# Patient Record
Sex: FEMALE | Race: WHITE | NOT HISPANIC OR LATINO | Employment: STUDENT | URBAN - METROPOLITAN AREA
[De-identification: names, ages, dates, MRNs, and addresses within clinical notes are randomized per-mention and may not be internally consistent; named-entity substitution may affect disease eponyms.]

---

## 2022-06-23 ENCOUNTER — TRANSFERRED RECORDS (OUTPATIENT)
Dept: HEALTH INFORMATION MANAGEMENT | Facility: CLINIC | Age: 27
End: 2022-06-23

## 2023-08-03 ENCOUNTER — TRANSFERRED RECORDS (OUTPATIENT)
Dept: HEALTH INFORMATION MANAGEMENT | Facility: CLINIC | Age: 28
End: 2023-08-03

## 2023-10-11 ENCOUNTER — MEDICAL CORRESPONDENCE (OUTPATIENT)
Dept: HEALTH INFORMATION MANAGEMENT | Facility: CLINIC | Age: 28
End: 2023-10-11
Payer: MEDICAID

## 2023-10-16 ENCOUNTER — TELEPHONE (OUTPATIENT)
Dept: ORTHOPEDICS | Facility: CLINIC | Age: 28
End: 2023-10-16
Payer: MEDICAID

## 2023-10-16 NOTE — TELEPHONE ENCOUNTER
Writer received an email referral from Dr. Marcos Burgos for osteoid osteoma of left tibia    Karen Gonzalez LPN

## 2023-10-30 NOTE — TELEPHONE ENCOUNTER
Action November 2, 2023 2:41 PM MT   Action Taken Called patient, no answer, called mom, mom will text the patient and have her call us back. Phone # given.    [facility] , tt: Venecia  Action November 2, 2023 3:20 PM MT   Action Taken Patient called back and states imaging was done at [facility]. Patient gave VBOK for CE update.   Called [facility], rep: Bethany will push imaging STAT!     DIAGNOSIS: Left Tibia Osteoid Osteoma   APPOINTMENT DATE: 11/06/2023   NOTES STATUS DETAILS   OFFICE NOTE from referring provider Media Tab 08/03/2023 - Aubrey Rodríguez DO - [facility]'s    MRI PACS [facility]:  11/22/2017 - Left Tib-Fib   XRAYS (IMAGES & REPORTS) PACS . [facility]:  08/04/2023, 06/23/2022 - LT Tib-Fib

## 2023-10-31 NOTE — PROGRESS NOTES
[facility], Orthopaedic Oncology Surgery Consultation  by Anson Jay M.D.    Tiffanie Hdez MRN# [id number]    YOB: 1995     Requesting physician: No ref. provider found  No Ref-Primary, Physician  Arlin Juárez, CNP [facility]            Assessment and Plan:   Assessment:  Lesion of left tibia diaphyseal anterior cortex of uncertain etiology.  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  Differential diagnosis would include juxtacortical and cortical-based bone forming tumor such as juxtacortical osteosarcoma, osteofibrous dysplasia, adamantinoma, or ossified periosteal chondroma, or nonneoplastic entities such as insufficiency stress type fracture.     Plan:  Obtain prior radiographs from 2017 for comparison purposes  Obtain MRI of unilateral left tibia to evaluate for any medullary involvement  Obtain CT of left tibia to look for nidus formation.  In person follow-up visit thereafter.  Patient resides locally attending medical school at .    MD Trini Johnson Family Professor  Oncology and Adult Reconstructive Surgery  Dept Orthopaedic Surgery, [facility]  [phone] office, [phone] pager  [email or web address]             History of Present Illness:   27 year old female  chief complaint    This patient describes history of the last 6 years of a lesion of the left anterior leg over the midportion that has been enlarging in size slowly since that time.  She is never really had any significant symptoms but just describes occasional discomfort with strenuous use such as running that may occur once every couple of months.  No night pain.  No known history of blunt trauma or injury.    Current symptoms:  Problem: Left tibia osteoid osteoma  Onset and duration: since 2017  Awakens from sleep due  to sx's:  No  Precipitating Injury:  No    Other joints or sites painful:  No  Fever: No  Appetite change or weight loss: No  History of prior or existing cancer: No           Physical Exam:     EXAMINATION pertinent findings:   PSYCH: Pleasant, healthy-appearing, alert, oriented x3, cooperative. Normal mood and affect.  VITAL SIGNS: There were no vitals taken for this visit..  Reviewed nursing intake notes.   There is no height or weight on file to calculate BMI.  RESP: non labored breathing   SKIN: grossly normal over lower extremity, left  LYMPHATIC: grossly normal, no adenopathy, no extremity edema  NEURO: grossly normal , no motor deficits  VASCULAR: satisfactory perfusion of all extremities   MUSCULOSKELETAL:   Gait is normal.  Full range of motion of hip and knee noted.  Left knee specifically 0 to 135 degrees range of motion.  No effusion.  Collateral and cruciate ligaments stable.  No warmth.  Left anterior leg has bony prominence, firm, nontender, no erythema or warmth or fluctuance.  Left ankle normal.           Data:   All laboratory data reviewed  All imaging studies reviewed by me    MRI from 2017 is reviewed compared to current MRI of 2023.  Radiographs from 2022 in 2020 are reviewed as well.  Unicortical lesion along the anterior medial cortex of the tibia with thickening noted.  No clear nidus formation is seen, however multiple signal abnormalities within the cortex are noted and concern over subtle findings on the posterior cortex as well.    DATA for DOCUMENTATION:         Past Medical History:   There is no problem list on file for this patient.    No past medical history on file.    Also see scanned health assessment forms.       Past Surgical History:   No past surgical history on file.         Social History:     Social History     Socioeconomic History    Marital status: Single     Spouse name: Not on file    Number of children: Not on file    Years of education: Not on file    Highest  education level: Not on file   Occupational History    Not on file   Tobacco Use    Smoking status: Not on file    Smokeless tobacco: Not on file   Substance and Sexual Activity    Alcohol use: Not on file    Drug use: Not on file    Sexual activity: Not on file   Other Topics Concern    Not on file   Social History Narrative    Not on file     Social Determinants of Health     Financial Resource Strain: Not on file   Food Insecurity: Not on file   Transportation Needs: Not on file   Physical Activity: Not on file   Stress: Not on file   Social Connections: Not on file   Interpersonal Safety: Not on file   Housing Stability: Not on file            Family History:     No family history on file.         Medications:     No current outpatient medications on file.     No current facility-administered medications for this visit.              Review of Systems:   A comprehensive 10 point review of systems (constitutional, ENT, cardiac, peripheral vascular, lymphatic, respiratory, GI, , Musculoskeletal, skin, Neurological) was performed and found to be negative except as described in this note.     See intake form completed by patient

## 2023-11-02 ENCOUNTER — OFFICE VISIT (OUTPATIENT)
Dept: OBGYN | Facility: CLINIC | Age: 28
End: 2023-11-02
Payer: COMMERCIAL

## 2023-11-02 VITALS
DIASTOLIC BLOOD PRESSURE: 76 MMHG | TEMPERATURE: 99.1 F | WEIGHT: 123 LBS | HEART RATE: 76 BPM | RESPIRATION RATE: 16 BRPM | SYSTOLIC BLOOD PRESSURE: 125 MMHG

## 2023-11-02 DIAGNOSIS — Z30.432 ENCOUNTER FOR IUD REMOVAL: ICD-10-CM

## 2023-11-02 DIAGNOSIS — Z01.419 ENCOUNTER FOR ANNUAL ROUTINE GYNECOLOGICAL EXAMINATION: Primary | ICD-10-CM

## 2023-11-02 PROCEDURE — 58301 REMOVE INTRAUTERINE DEVICE: CPT | Performed by: OBSTETRICS & GYNECOLOGY

## 2023-11-02 PROCEDURE — G0124 SCREEN C/V THIN LAYER BY MD: HCPCS | Performed by: PATHOLOGY

## 2023-11-02 PROCEDURE — 87624 HPV HI-RISK TYP POOLED RSLT: CPT | Performed by: OBSTETRICS & GYNECOLOGY

## 2023-11-02 PROCEDURE — G0145 SCR C/V CYTO,THINLAYER,RESCR: HCPCS | Performed by: OBSTETRICS & GYNECOLOGY

## 2023-11-02 PROCEDURE — 99385 PREV VISIT NEW AGE 18-39: CPT | Mod: 25 | Performed by: OBSTETRICS & GYNECOLOGY

## 2023-11-02 NOTE — PROGRESS NOTES
"Tiffanie is a 27 year old No obstetric history on file. female who presents for annual exam.     Menses are regular q 28-30 days and heavy lasting  4-5  days.  Menses flow: heavy.  Patient's last menstrual period was 10/19/2023 (approximate).. Using IUD for contraception.  She is not currently considering pregnancy.  Besides routine health maintenance,  she would like to have her IUD removed. She had a ParaGard IUD placed about a year ago and had increased bleeding and cramping with periods. Not currently sexually active but would consider a Levonorgestrel IUD in the future if needed.   GYNECOLOGIC HISTORY:  Menarche: 15  Age at first intercourse: 26  Number of lifetime partners: 1  Tiffanie is not sexually active with male partner(s) and is not currently in monogamous relationship.    History sexually transmitted infections:No STD history  STI testing offered?  Accepted  DILMA exposure: Unknown  History of abnormal Pap smear: NO - age 21-29 PAP every 3 years recommended  Family history of breast CA: Yes (Please explain): Maternal & Paternal Grandmother  Family history of uterine/ovarian CA: No    Family history of colon CA: No    HEALTH MAINTENANCE:  Cholesterol: (No results found for: \"CHOL\" History of abnormal lipids: No  Mammo: No . History of abnormal Mammo: Not applicable.  Regular Self Breast Exams: No  Calcium/Vitamin D intake: source:  dietary supplement(s) Adequate? Yes  TSH: (No results found for: \"TSH\" )  Pap; (No results found for: \"PAP\" )    HISTORY:  OB History   No obstetric history on file.     Past Medical History:   Diagnosis Date    Osteoma     Scoliosis      Past Surgical History:   Procedure Laterality Date    NO HISTORY OF SURGERY       No family history on file.  Social History     Socioeconomic History    Marital status: Single     Spouse name: None    Number of children: None    Years of education: None    Highest education level: None   Tobacco Use    Smoking status: Never     Passive exposure: " Never    Smokeless tobacco: Never   Vaping Use    Vaping Use: Never used     No current outpatient medications on file.   No Known Allergies    Past medical, surgical, social and family history were reviewed and updated in EPIC.    ROS:   C:     NEGATIVE for fever, chills, change in weight  I:       NEGATIVE for worrisome rashes, moles or lesions  E:     NEGATIVE for vision changes or irritation  E/M: NEGATIVE for ear, mouth and throat problems  R:     NEGATIVE for significant cough or SOB  CV:   NEGATIVE for chest pain, palpitations or peripheral edema  GI:     NEGATIVE for nausea, abdominal pain, heartburn, or change in bowel habits  :   NEGATIVE for frequency, dysuria, hematuria, vaginal discharge, or irregular bleeding  M:     NEGATIVE for significant arthralgias or myalgia  N:      NEGATIVE for weakness, dizziness or paresthesias  E:      NEGATIVE for temperature intolerance, skin/hair changes  P:      NEGATIVE for changes in mood or affect.    EXAM:  /76 (BP Location: Right arm, Patient Position: Sitting, Cuff Size: Adult Regular)   Pulse 76   Temp 99.1  F (37.3  C)   Resp 16   Wt 55.8 kg (123 lb)   LMP 10/19/2023 (Approximate)    BMI: There is no height or weight on file to calculate BMI.  Constitutional: healthy, alert and no distress  Head: Normocephalic. No masses, lesions, tenderness or abnormalities  Neck: Neck supple. Trachea midline. No adenopathy. Thyroid symmetric, normal size.   Cardiovascular: RRR.   Respiratory: Negative.   Breast: No nodularity, asymmetry or nipple discharge bilaterally.  Gastrointestinal: Abdomen soft, non-tender, non-distended. No masses, organomegaly.  :  Vulva:  No external lesions, normal female hair distribution, no inguinal adenopathy.    Urethra:  Midline, non-tender, well supported, no discharge  Vagina:  Moist, pink, no abnormal discharge, no lesions  Uterus:  Normal size, anteverted , non-tender, freely mobile  Ovaries:  No masses appreciated,  non-tender, mobile  Rectal Exam: deferred  Musculoskeletal: extremities normal  Skin: no suspicious lesions or rashes  Psychiatric: Affect appropriate, cooperative,mentation appears normal.     IUD Removal Procedure Note   After informed consent was obtained and the patient's identify was verified a speculum was placed in the vagina. The IUD threads were visualized and grasped with a ring forceps. The IUD was removed with steady traction on the strings without difficulty. The IUD was inspected and found to be intact. There cervix was hemostatic. The patient tolerated the procedure well. EBL none.     COUNSELING:   Reviewed preventive health counseling, as reflected in patient instructions       Contraception   reports that she has never smoked. She has never been exposed to tobacco smoke. She has never used smokeless tobacco.    There is no height or weight on file to calculate BMI.    FRAX Risk Assessment    ASSESSMENT:  27 year old female with satisfactory annual exam  (Z01.419) Encounter for annual routine gynecological examination  (primary encounter diagnosis)  -Normal clinical  breast and pelvic exam   -Pap obtained   -HPV and flu vaccine UTD, will get COVID in the next few weeks.   -Negative depression screen  -IUD removed, declines need for contraception at this time but options briefly reviewed   Plan: Pap screen reflex to HPV if ASCUS - recommend         age 25 - 29            (Z30.432) Encounter for IUD removal  Comment: IUD removed without difficulty   Plan: REMOVE INTRAUTERINE DEVICE          RTC in one year or sooner MUNIRA Desai MD

## 2023-11-06 ENCOUNTER — OFFICE VISIT (OUTPATIENT)
Dept: ORTHOPEDICS | Facility: CLINIC | Age: 28
End: 2023-11-06
Payer: COMMERCIAL

## 2023-11-06 ENCOUNTER — PRE VISIT (OUTPATIENT)
Dept: ORTHOPEDICS | Facility: CLINIC | Age: 28
End: 2023-11-06

## 2023-11-06 VITALS — BODY MASS INDEX: 19.29 KG/M2 | WEIGHT: 120 LBS | HEIGHT: 66 IN

## 2023-11-06 DIAGNOSIS — M89.9 BONE LESION: Primary | ICD-10-CM

## 2023-11-06 DIAGNOSIS — D16.22 BENIGN NEOPLASM OF LONG BONE OF LEFT LOWER LIMB: ICD-10-CM

## 2023-11-06 PROCEDURE — 99204 OFFICE O/P NEW MOD 45 MIN: CPT | Performed by: ORTHOPAEDIC SURGERY

## 2023-11-06 NOTE — LETTER
11/6/2023         RE: Tiffanie Hdez  [address]  [facility] [address]          [facility], Orthopaedic Oncology Surgery Consultation  by Anson Jay M.D.    Tiffanie Hdez MRN# [id number]    YOB: 1995     Requesting physician: No ref. provider found  No Ref-Primary, Physician  Arlin Juárez, CNP [facility]            Assessment and Plan:   Assessment:  Lesion of left tibia diaphyseal anterior cortex of uncertain etiology.  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  Differential diagnosis would include juxtacortical and cortical-based bone forming tumor such as juxtacortical osteosarcoma, osteofibrous dysplasia, adamantinoma, or ossified periosteal chondroma, or nonneoplastic entities such as insufficiency stress type fracture.     Plan:  Obtain prior radiographs from 2017 for comparison purposes  Obtain MRI of unilateral left tibia to evaluate for any medullary involvement  Obtain CT of left tibia to look for nidus formation.  In person follow-up visit thereafter.  Patient resides locally attending medical school at .    MD Trini Johnson Family Professor  Oncology and Adult Reconstructive Surgery  Dept Orthopaedic Surgery, [facility]  [phone] office, [phone] pager  [email or web address]             History of Present Illness:   27 year old female  chief complaint    This patient describes history of the last 6 years of a lesion of the left anterior leg over the midportion that has been enlarging in size slowly since that time.  She is never really had any significant symptoms but just describes occasional discomfort with strenuous use such as running that may occur once every couple of months.  No night pain.  No known history of blunt trauma or injury.    Current  symptoms:  Problem: Left tibia osteoid osteoma  Onset and duration: since 2017  Awakens from sleep due to sx's:  No  Precipitating Injury:  No    Other joints or sites painful:  No  Fever: No  Appetite change or weight loss: No  History of prior or existing cancer: No           Physical Exam:     EXAMINATION pertinent findings:   PSYCH: Pleasant, healthy-appearing, alert, oriented x3, cooperative. Normal mood and affect.  VITAL SIGNS: There were no vitals taken for this visit..  Reviewed nursing intake notes.   There is no height or weight on file to calculate BMI.  RESP: non labored breathing   SKIN: grossly normal over lower extremity, left  LYMPHATIC: grossly normal, no adenopathy, no extremity edema  NEURO: grossly normal , no motor deficits  VASCULAR: satisfactory perfusion of all extremities   MUSCULOSKELETAL:   Gait is normal.  Full range of motion of hip and knee noted.  Left knee specifically 0 to 135 degrees range of motion.  No effusion.  Collateral and cruciate ligaments stable.  No warmth.  Left anterior leg has bony prominence, firm, nontender, no erythema or warmth or fluctuance.  Left ankle normal.           Data:   All laboratory data reviewed  All imaging studies reviewed by me    MRI from 2017 is reviewed compared to current MRI of 2023.  Radiographs from 2022 in 2020 are reviewed as well.  Unicortical lesion along the anterior medial cortex of the tibia with thickening noted.  No clear nidus formation is seen, however multiple signal abnormalities within the cortex are noted and concern over subtle findings on the posterior cortex as well.    DATA for DOCUMENTATION:         Past Medical History:   There is no problem list on file for this patient.    No past medical history on file.    Also see scanned health assessment forms.       Past Surgical History:   No past surgical history on file.         Social History:     Social History     Socioeconomic History    Marital status: Single     Spouse  name: Not on file    Number of children: Not on file    Years of education: Not on file    Highest education level: Not on file   Occupational History    Not on file   Tobacco Use    Smoking status: Not on file    Smokeless tobacco: Not on file   Substance and Sexual Activity    Alcohol use: Not on file    Drug use: Not on file    Sexual activity: Not on file   Other Topics Concern    Not on file   Social History Narrative    Not on file     Social Determinants of Health     Financial Resource Strain: Not on file   Food Insecurity: Not on file   Transportation Needs: Not on file   Physical Activity: Not on file   Stress: Not on file   Social Connections: Not on file   Interpersonal Safety: Not on file   Housing Stability: Not on file            Family History:     No family history on file.         Medications:     No current outpatient medications on file.     No current facility-administered medications for this visit.              Review of Systems:   A comprehensive 10 point review of systems (constitutional, ENT, cardiac, peripheral vascular, lymphatic, respiratory, GI, , Musculoskeletal, skin, Neurological) was performed and found to be negative except as described in this note.     See intake form completed by patient

## 2023-11-06 NOTE — NURSING NOTE
"Chief Complaint   Patient presents with    Consult     Osteoid osteoma of left tibia       27 year old  1995    Primary MD: No Primary Physician  Ref. MD: Dr. Marcos Burgos    Ht 1.67 m (5' 5.75\")   Wt 54.4 kg (120 lb)   LMP 10/19/2023 (Approximate)   BMI 19.52 kg/m             Pain Assessment  Patient Currently in Pain: Kailyn               [id number] [address] AT [address] [place] [address]        No Known Allergies        No current outpatient medications on file.     No current facility-administered medications for this visit.         Questionnaires:    Promis 10 Assessment        11/5/2023     3:28 PM   PROMIS 10   In general, would you say your health is: Very good   In general, would you say your quality of life is: Very good   In general, how would you rate your physical health? Very good   In general, how would you rate your mental health, including your mood and your ability to think? Very good   In general, how would you rate your satisfaction with your social activities and relationships? Very good   In general, please rate how well you carry out your usual social activities and roles Very good   To what extent are you able to carry out your everyday physical activities such as walking, climbing stairs, carrying groceries, or moving a chair? Completely   In the past 7 days, how often have you been bothered by emotional problems such as feeling anxious, depressed, or irritable? Rarely   In the past 7 days, how would you rate your fatigue on average? Mild   In the past 7 days, how would you rate your pain on average, where 0 means no pain, and 10 means worst imaginable pain? 0   In general, would you say your health is: 4   In general, would you say your quality of life is: 4   In general, how would you rate your physical health? 4   In general, how would you rate your mental health, including your mood and your ability to think? 4   In general, how would " you rate your satisfaction with your social activities and relationships? 4   In general, please rate how well you carry out your usual social activities and roles. (This includes activities at home, at work and in your community, and responsibilities as a parent, child, spouse, employee, friend, etc.) 4   To what extent are you able to carry out your everyday physical activities such as walking, climbing stairs, carrying groceries, or moving a chair? 5   In the past 7 days, how often have you been bothered by emotional problems such as feeling anxious, depressed, or irritable? 2   In the past 7 days, how would you rate your fatigue on average? 2   In the past 7 days, how would you rate your pain on average, where 0 means no pain, and 10 means worst imaginable pain? 0   Global Mental Health Score 16   Global Physical Health Score 18   PROMIS TOTAL - SUBSCORES 34

## 2023-11-06 NOTE — LETTER
11/6/2023         RE: Tiffanie Hdez  [address]  [facility] [address]        Dear Colleague,    Thank you for referring your patient, Tiffanie Hdez, to the [facility] ORTHOP[facility] [place]. Please see a copy of my visit note below.        [facility], Orthopaedic Oncology Surgery Consultation  by Anson Jay M.D.    Tiffanie Hdez MRN# [id number]    YOB: 1995     Requesting physician: No ref. provider found  No Ref-Primary, Physician  Arlin Juárez, CNP [facility]            Assessment and Plan:   Assessment:  Lesion of left tibia diaphyseal anterior cortex of uncertain etiology.  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  Differential diagnosis would include juxtacortical and cortical-based bone forming tumor such as juxtacortical osteosarcoma, osteofibrous dysplasia, adamantinoma, or ossified periosteal chondroma, or nonneoplastic entities such as insufficiency stress type fracture.     Plan:  Obtain prior radiographs from 2017 for comparison purposes  Obtain MRI of unilateral left tibia to evaluate for any medullary involvement  Obtain CT of left tibia to look for nidus formation.  In person follow-up visit thereafter.  Patient resides locally attending medical school at .    MD Trini Johnson Family Professor  Oncology and Adult Reconstructive Surgery  Dept Orthopaedic Surgery, [facility]  [phone] office, [phone] pager  [email or web address]             History of Present Illness:   27 year old female  chief complaint    This patient describes history of the last 6 years of a lesion of the left anterior leg over the midportion that has been enlarging in size slowly since that time.  She is never really had any significant symptoms but just describes  occasional discomfort with strenuous use such as running that may occur once every couple of months.  No night pain.  No known history of blunt trauma or injury.    Current symptoms:  Problem: Left tibia osteoid osteoma  Onset and duration: since 2017  Awakens from sleep due to sx's:  No  Precipitating Injury:  No    Other joints or sites painful:  No  Fever: No  Appetite change or weight loss: No  History of prior or existing cancer: No           Physical Exam:     EXAMINATION pertinent findings:   PSYCH: Pleasant, healthy-appearing, alert, oriented x3, cooperative. Normal mood and affect.  VITAL SIGNS: There were no vitals taken for this visit..  Reviewed nursing intake notes.   There is no height or weight on file to calculate BMI.  RESP: non labored breathing   SKIN: grossly normal over lower extremity, left  LYMPHATIC: grossly normal, no adenopathy, no extremity edema  NEURO: grossly normal , no motor deficits  VASCULAR: satisfactory perfusion of all extremities   MUSCULOSKELETAL:   Gait is normal.  Full range of motion of hip and knee noted.  Left knee specifically 0 to 135 degrees range of motion.  No effusion.  Collateral and cruciate ligaments stable.  No warmth.  Left anterior leg has bony prominence, firm, nontender, no erythema or warmth or fluctuance.  Left ankle normal.           Data:   All laboratory data reviewed  All imaging studies reviewed by me    MRI from 2017 is reviewed compared to current MRI of 2023.  Radiographs from 2022 in 2020 are reviewed as well.  Unicortical lesion along the anterior medial cortex of the tibia with thickening noted.  No clear nidus formation is seen, however multiple signal abnormalities within the cortex are noted and concern over subtle findings on the posterior cortex as well.    DATA for DOCUMENTATION:         Past Medical History:   There is no problem list on file for this patient.    No past medical history on file.    Also see [facility] assessment  forms.       Past Surgical History:   No past surgical history on file.         Social History:     Social History     Socioeconomic History    Marital status: Single     Spouse name: Not on file    Number of children: Not on file    Years of education: Not on file    Highest education level: Not on file   Occupational History    Not on file   Tobacco Use    Smoking status: Not on file    Smokeless tobacco: Not on file   Substance and Sexual Activity    Alcohol use: Not on file    Drug use: Not on file    Sexual activity: Not on file   Other Topics Concern    Not on file   Social History Narrative    Not on file     Social Determinants of Health     Financial Resource Strain: Not on file   Food Insecurity: Not on file   Transportation Needs: Not on file   Physical Activity: Not on file   Stress: Not on file   Social Connections: Not on file   Interpersonal Safety: Not on file   Housing Stability: Not on file            Family History:     No family history on file.         Medications:     No current outpatient medications on file.     No current facility-administered medications for this visit.              Review of Systems:   A comprehensive 10 point review of systems (constitutional, ENT, cardiac, peripheral vascular, lymphatic, respiratory, GI, , Musculoskeletal, skin, Neurological) was performed and found to be negative except as described in this note.     See intake form completed by patient

## 2023-11-06 NOTE — LETTER
11/6/2023       RE: Tiffanie Hdez  [address]  [place] [address]     Dear Colleague,    Thank you for referring your patient, Tiffanie Hdez, to the [facility] ORTHOPEDIC [facility] [place] [facility]. Please see a copy of my visit note below.        [facility], Orthopaedic Oncology Surgery Consultation  by Anson Jay M.D.    Tiffanie Hdez MRN# [id number]    YOB: 1995     Requesting physician: No ref. provider found  No Ref-Primary, Physician  Arlin Juárez, CNP [facility]            Assessment and Plan:   Assessment:  Lesion of left tibia diaphyseal anterior cortex of uncertain etiology.  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  Differential diagnosis would include juxtacortical and cortical-based bone forming tumor such as juxtacortical osteosarcoma, osteofibrous dysplasia, adamantinoma, or ossified periosteal chondroma, or nonneoplastic entities such as insufficiency stress type fracture.     Plan:  Obtain prior radiographs from 2017 for comparison purposes  Obtain MRI of unilateral left tibia to evaluate for any medullary involvement  Obtain CT of left tibia to look for nidus formation.  In person follow-up visit thereafter.  Patient resides locally attending medical school at .    MD Trini Johnson Family Professor  Oncology and Adult Reconstructive Surgery  Dept Orthopaedic Surgery, [facility]  [phone] office, [phone] pager  [email or web address]             History of Present Illness:   27 year old female  chief complaint    This patient describes history of the last 6 years of a lesion of the left anterior leg over the midportion that has been enlarging in size slowly since that time.  She is never really had  any significant symptoms but just describes occasional discomfort with strenuous use such as running that may occur once every couple of months.  No night pain.  No known history of blunt trauma or injury.    Current symptoms:  Problem: Left tibia osteoid osteoma  Onset and duration: since 2017  Awakens from sleep due to sx's:  No  Precipitating Injury:  No    Other joints or sites painful:  No  Fever: No  Appetite change or weight loss: No  History of prior or existing cancer: No           Physical Exam:     EXAMINATION pertinent findings:   PSYCH: Pleasant, healthy-appearing, alert, oriented x3, cooperative. Normal mood and affect.  VITAL SIGNS: There were no vitals taken for this visit..  Reviewed nursing intake notes.   There is no height or weight on file to calculate BMI.  RESP: non labored breathing   SKIN: grossly normal over lower extremity, left  LYMPHATIC: grossly normal, no adenopathy, no extremity edema  NEURO: grossly normal , no motor deficits  VASCULAR: satisfactory perfusion of all extremities   MUSCULOSKELETAL:   Gait is normal.  Full range of motion of hip and knee noted.  Left knee specifically 0 to 135 degrees range of motion.  No effusion.  Collateral and cruciate ligaments stable.  No warmth.  Left anterior leg has bony prominence, firm, nontender, no erythema or warmth or fluctuance.  Left ankle normal.           Data:   All laboratory data reviewed  All imaging studies reviewed by me    MRI from 2017 is reviewed compared to current MRI of 2023.  Radiographs from 2022 in 2020 are reviewed as well.  Unicortical lesion along the anterior medial cortex of the tibia with thickening noted.  No clear nidus formation is seen, however multiple signal abnormalities within the cortex are noted and concern over subtle findings on the posterior cortex as well.    DATA for DOCUMENTATION:         Past Medical History:   There is no problem list on file for this patient.    No past medical history on  file.    Also see scanned health assessment forms.       Past Surgical History:   No past surgical history on file.         Social History:     Social History     Socioeconomic History     Marital status: Single     Spouse name: Not on file     Number of children: Not on file     Years of education: Not on file     Highest education level: Not on file   Occupational History     Not on file   Tobacco Use     Smoking status: Not on file     Smokeless tobacco: Not on file   Substance and Sexual Activity     Alcohol use: Not on file     Drug use: Not on file     Sexual activity: Not on file   Other Topics Concern     Not on file   Social History Narrative     Not on file     Social Determinants of Health     Financial Resource Strain: Not on file   Food Insecurity: Not on file   Transportation Needs: Not on file   Physical Activity: Not on file   Stress: Not on file   Social Connections: Not on file   Interpersonal Safety: Not on file   Housing Stability: Not on file            Family History:     No family history on file.         Medications:     No current outpatient medications on file.     No current facility-administered medications for this visit.              Review of Systems:   A comprehensive 10 point review of systems (constitutional, ENT, cardiac, peripheral vascular, lymphatic, respiratory, GI, , Musculoskeletal, skin, Neurological) was performed and found to be negative except as described in this note.     See intake form completed by patient      Again, thank you for allowing me to participate in the care of your patient.      Sincerely,    Anson Jay MD

## 2023-11-08 LAB
BKR LAB AP GYN ADEQUACY: ABNORMAL
BKR LAB AP GYN INTERPRETATION: ABNORMAL
BKR LAB AP HPV REFLEX: ABNORMAL
BKR LAB AP LMP: ABNORMAL
BKR LAB AP PREVIOUS ABNORMAL: ABNORMAL
PATH REPORT.COMMENTS IMP SPEC: ABNORMAL
PATH REPORT.COMMENTS IMP SPEC: ABNORMAL
PATH REPORT.RELEVANT HX SPEC: ABNORMAL

## 2023-11-10 PROBLEM — R87.610 ASCUS OF CERVIX WITH NEGATIVE HIGH RISK HPV: Status: ACTIVE | Noted: 2023-11-02

## 2023-11-10 LAB
HUMAN PAPILLOMA VIRUS 16 DNA: NEGATIVE
HUMAN PAPILLOMA VIRUS 18 DNA: NEGATIVE
HUMAN PAPILLOMA VIRUS FINAL DIAGNOSIS: NORMAL
HUMAN PAPILLOMA VIRUS OTHER HR: NEGATIVE

## 2023-11-28 NOTE — PROGRESS NOTES
[facility], Orthopaedic Oncology Surgery Consultation  by Anson Jay M.D.    Tiffanie Hdez MRN# [id number]    YOB: 1995     Requesting physician: No ref. provider found  No Ref-Primary, Physician  Arlin Juárez, CNP [facility]     Tiffanie returns for recheck and to review the MRI and imaging findings.  We also obtained her scan from 2017 for comparison purposes.    She remains asymptomatic.  She confirms that she never had any pain involving her leg, even dating back to 2017.    MRI examination and CT reviewed.  I also reviewed with our [facility] radiology team as well.  The lesion is juxtacortical involving the surface of her tibial diaphysis and there is no involvement of the intramedullary canal.    Stability favors a benign diagnosis yet the appearance is not typical for any particular entity but osteoid osteoma is favored.         Assessment and Plan:   Assessment:  Lesion of left tibia diaphyseal anterior cortex of uncertain etiology.  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  Differential diagnosis would include juxtacortical and cortical-based bone forming tumor such as juxtacortical osteosarcoma, osteofibrous dysplasia, adamantinoma, or ossified periosteal chondroma, or nonneoplastic entities such as insufficiency stress type fracture.     Plan:  I had a detailed conversation with Tiffanie about the pros and cons of either serial observation as opposed to performing a biopsy.  As the appearance is not typical, and her history does not fit with a suspected imaging diagnoses, I recommended proceeding with a biopsy procedure and the patient has indicated that she prefers this as well.  Her main concern is ruling out any concerning diagnosis as opposed to the cosmetic appearanc of either the mass or a surgical  incision.    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    Anson Jay MD  [facility] Family Professor  Oncology and Adult Reconstructive Surgery  Dept Orthopaedic Surgery, [facility]  [phone] office, [phone] pager  [email or web address]    Total combined visit time and work time before and after clinic visit on encounter date = 20 min

## 2023-11-29 ENCOUNTER — ANCILLARY PROCEDURE (OUTPATIENT)
Dept: CT IMAGING | Facility: CLINIC | Age: 28
End: 2023-11-29
Attending: ORTHOPAEDIC SURGERY
Payer: COMMERCIAL

## 2023-11-29 DIAGNOSIS — M89.9 BONE LESION: ICD-10-CM

## 2023-11-29 PROCEDURE — 73700 CT LOWER EXTREMITY W/O DYE: CPT | Mod: LT | Performed by: RADIOLOGY

## 2023-12-03 ENCOUNTER — ANCILLARY PROCEDURE (OUTPATIENT)
Dept: MRI IMAGING | Facility: CLINIC | Age: 28
End: 2023-12-03
Attending: ORTHOPAEDIC SURGERY
Payer: COMMERCIAL

## 2023-12-03 DIAGNOSIS — M89.9 BONE LESION: ICD-10-CM

## 2023-12-03 PROCEDURE — A9585 GADOBUTROL INJECTION: HCPCS | Performed by: RADIOLOGY

## 2023-12-03 PROCEDURE — 73720 MRI LWR EXTREMITY W/O&W/DYE: CPT | Mod: LT | Performed by: RADIOLOGY

## 2023-12-03 RX ORDER — GADOBUTROL 604.72 MG/ML
7.5 INJECTION INTRAVENOUS ONCE
Status: COMPLETED | OUTPATIENT
Start: 2023-12-03 | End: 2023-12-03

## 2023-12-03 RX ADMIN — GADOBUTROL 5 ML: 604.72 INJECTION INTRAVENOUS at 08:45

## 2023-12-04 ENCOUNTER — OFFICE VISIT (OUTPATIENT)
Dept: ORTHOPEDICS | Facility: CLINIC | Age: 28
End: 2023-12-04
Payer: COMMERCIAL

## 2023-12-04 DIAGNOSIS — M89.9 BONE LESION: Primary | ICD-10-CM

## 2023-12-04 PROCEDURE — 99214 OFFICE O/P EST MOD 30 MIN: CPT | Performed by: ORTHOPAEDIC SURGERY

## 2023-12-04 NOTE — LETTER
12/4/2023         RE: Tiffanie Hdez  [address]  [place] [facility] [address]        Dear Colleague,    Thank you for referring your patient, Tiffanie Hdez, to the [facility]THOPED[facility] [place]. Please see a copy of my visit note below.        [facility], Orthopaedic Oncology Surgery Consultation  by Anson Jay M.D.    Tiffanie Hdez MRN# [id number]    YOB: 1995     Requesting physician: No ref. provider found  No Ref-Primary, Physician  Arlin Juárez, CNP [facility]     Tiffanie returns for recheck and to review the MRI and imaging findings.  We also obtained her scan from 2017 for comparison purposes.    She remains asymptomatic.  She confirms that she never had any pain involving her leg, even dating back to 2017.    MRI examination and CT reviewed.  I also reviewed with our [facility] radiology team as well.  The lesion is juxtacortical involving the surface of her tibial diaphysis and there is no involvement of the intramedullary canal.    Stability favors a benign diagnosis yet the appearance is not typical for any particular entity but osteoid osteoma is favored.         Assessment and Plan:   Assessment:  Lesion of left tibia diaphyseal anterior cortex of uncertain etiology.  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  Differential diagnosis would include juxtacortical and cortical-based bone forming tumor such as juxtacortical osteosarcoma, osteofibrous dysplasia, adamantinoma, or ossified periosteal chondroma, or nonneoplastic entities such as insufficiency stress type fracture.     Plan:  I had a detailed conversation with Tiffanie about the pros and cons of either serial observation as opposed to performing a biopsy.  As the appearance is not typical, and her history does not fit with a  suspected imaging diagnoses, I recommended proceeding with a biopsy procedure and the patient has indicated that she prefers this as well.  Her main concern is ruling out any concerning diagnosis as opposed to the cosmetic appearanc of either the mass or a surgical incision.    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    MD Trini Johnson Family Professor  Oncology and Adult Reconstructive Surgery  Dept Orthopaedic Surgery, [facility]  [phone] office, [phone] pager  [email or web address]    Total combined visit time and work time before and after clinic visit on encounter date = 20 min

## 2023-12-04 NOTE — NURSING NOTE
- Surgery packet sent in mail. Attempted to call patient today 12/4 at 2:11 and voicemail left to call back.     - Attempted to call patient today 12/6 at 11:56am and voicemail left to call back.

## 2023-12-07 ENCOUNTER — TELEPHONE (OUTPATIENT)
Dept: ORTHOPEDICS | Facility: CLINIC | Age: 28
End: 2023-12-07
Payer: COMMERCIAL

## 2023-12-08 ENCOUNTER — TELEPHONE (OUTPATIENT)
Dept: ORTHOPEDICS | Facility: CLINIC | Age: 28
End: 2023-12-08
Payer: COMMERCIAL

## 2023-12-08 NOTE — TELEPHONE ENCOUNTER
Phoned patient to get her schedule for surgery with Dr. Jay.     Call went directly to voicemail .Provided reason of call and call back number of [phone].    Will try again.

## 2023-12-08 NOTE — NURSING NOTE
A call was placed to the patient and pre-op teaching was performed over the phone.    Teaching Flowsheet   Relevant Diagnosis: L lower extremity biopsy  Teaching Topic: Pre-Operative Teaching     Person(s) involved in teaching:   Patient     Motivation Level:  Asks Questions: Yes  Eager to Learn: Yes  Cooperative: Yes  Receptive (willing/able to accept information): Yes  Any cultural factors/Synagogue beliefs that may influence understanding or compliance? No     Patient demonstrates understanding of the following:  Reason for the appointment, diagnosis and treatment plan: Yes  Knowledge of proper use of medications and conditions for which they are ordered (with special attention to potential side effects or drug interactions): Yes  Which situations necessitate calling provider and whom to contact: Yes- discussed the stoplight tool to help assist with this.      Teaching Concerns Addressed:      Proper use of surgical scrub explain and provided to patient.    Nutritional needs and diet plan: Yes  Pain management techniques: Yes  Wound Care: Yes  How and/when to access community resources: Yes     Instructional Materials Used/Given: surgical packet mailed. Instructed to purchase surgical soap at local pharmacy      - Important contact info/ phone numbers  - Map/ location of surgery  - Showering instructions  - Stop light tool    Additionally the following was discussed with patient:  - Patient informed responsible adult is required to drive her home and stay with her for 24 hours after surgery.        -Next step: Perioperative  to contact patient and schedule surgery/post ops. Patient does not have a PCP and is interested in seeing PAC for pre op H&P.    Time spent with patient: 15 minutes.     Tara Holter, RNCC

## 2023-12-08 NOTE — CONFIDENTIAL NOTE
Call placed to patient. Pre-op teaching preformed. See office visit 12/4 for documentation.    Tara Holter, RNCC

## 2023-12-12 ENCOUNTER — HOSPITAL ENCOUNTER (OUTPATIENT)
Facility: CLINIC | Age: 28
End: 2023-12-12
Attending: ORTHOPAEDIC SURGERY
Payer: COMMERCIAL

## 2023-12-12 PROBLEM — M89.9 BONE LESION: Status: ACTIVE | Noted: 2023-12-04

## 2023-12-12 NOTE — TELEPHONE ENCOUNTER
Patient is scheduled for surgery with Dr. Jay    Spoke with: Patient    Date of Surgery: 1/2/24    Location: [place]    Post op: 2 weeks    Pre op with Provider: Complete    H&P: Scheduled with PAC    Additional imaging/appointments: N/A    Surgery packet: Mailed to patient by RNCC    Additional comments: N/A        Cristiana Coburn MA on 12/12/2023 at 2:53 PM

## 2023-12-13 NOTE — TELEPHONE ENCOUNTER
FUTURE VISIT INFORMATION      SURGERY INFORMATION:  Date: 1/2/24  Location: [facility]  Surgeon:  Anson Jay MD   Anesthesia Type:  choice  Procedure: BIOPSY, BONE, LOWER EXTREMITY, Tibia diaphysis, Left.  No pre-op antibiotics   Consult: ov 12/4    RECORDS REQUESTED FROM:       Pertinent Medical History: None

## 2023-12-14 ENCOUNTER — VIRTUAL VISIT (OUTPATIENT)
Dept: SURGERY | Facility: CLINIC | Age: 28
End: 2023-12-14
Payer: COMMERCIAL

## 2023-12-14 ENCOUNTER — PRE VISIT (OUTPATIENT)
Dept: SURGERY | Facility: CLINIC | Age: 28
End: 2023-12-14

## 2023-12-14 ENCOUNTER — ANESTHESIA EVENT (OUTPATIENT)
Dept: SURGERY | Facility: CLINIC | Age: 28
End: 2023-12-14
Payer: COMMERCIAL

## 2023-12-14 DIAGNOSIS — M89.9 BONE LESION: ICD-10-CM

## 2023-12-14 DIAGNOSIS — Z01.818 PREOP EXAMINATION: Primary | ICD-10-CM

## 2023-12-14 PROCEDURE — 99203 OFFICE O/P NEW LOW 30 MIN: CPT | Mod: VID | Performed by: CLINICAL NURSE SPECIALIST

## 2023-12-14 ASSESSMENT — ENCOUNTER SYMPTOMS
DYSRHYTHMIAS: 0
SEIZURES: 0

## 2023-12-14 ASSESSMENT — LIFESTYLE VARIABLES: TOBACCO_USE: 0

## 2023-12-14 NOTE — H&P
Pre-Operative H & P     CC:  Preoperative exam to assess for increased cardiopulmonary risk while undergoing surgery and anesthesia.    Date of Encounter: 12/14/2023  Primary Care Physician:  No Ref-Primary, Physician     Reason for visit:   Encounter Diagnoses   Name Primary?    Preop examination Yes    Bone lesion        HPI  Tiffanie Hdez is a 28 year old female who presents for pre-operative H & P in preparation for  Procedure Information       Case: [id number] Date/Time: 01/02/24 1100    Procedure: BIOPSY, BONE, LOWER EXTREMITY, Tibia diaphysis, Left.  No pre-op antibiotics (Left: Leg)    Anesthesia type: Choice    Diagnosis: Bone lesion [M89.9]    Pre-op diagnosis: Bone lesion [M89.9]    Location: [facility]    Providers: Anson Jay MD            History is obtained from the patient and chart review    Patient with history of a lesion of the left tibia diaphyseal anterior cortex of uncertain etiology. Per notes the initial working diagnosis in 2017 was osteoid osteoma, however, imaging features are somewhat atypical as well as clinical features of being asymptomatic and continued growth in size over the last 6 years. She has been recently evaluated by Dr. Jay and counseled for above procedure for further evaluation.    She is otherwise healthy    Hx of abnormal bleeding or anti-platelet use: Denies    Menstrual history: Patient's last menstrual period was 10/19/2023 (approximate).     Past Medical History  Past Medical History:   Diagnosis Date    Bone lesion     Osteoma     Scoliosis        Past Surgical History  Past Surgical History:   Procedure Laterality Date    NO HISTORY OF SURGERY      [place] teeth extraction   03/2023       Prior to Admission Medications  No current outpatient medications on file.       Allergies  No Known Allergies    Social History  Social History     Socioeconomic History    Marital status: Single     Spouse name: Not on file    Number of children: Not on file    Years  of education: Not on file    Highest education level: Not on file   Occupational History    Not on file   Tobacco Use    Smoking status: Never     Passive exposure: Never    Smokeless tobacco: Never   Vaping Use    Vaping Use: Never used   Substance and Sexual Activity    Alcohol use: Not on file    Drug use: Not on file    Sexual activity: Not on file   Other Topics Concern    Not on file   Social History Narrative    Not on file     Social Determinants of Health     Financial Resource Strain: Not on file   Food Insecurity: Not on file   Transportation Needs: Not on file   Physical Activity: Not on file   Stress: Not on file   Social Connections: Not on file   Interpersonal Safety: Not on file   Housing Stability: Not on file       Family History  Family History   Problem Relation Age of Onset    Alzheimer Disease Maternal Grandmother     Breast Cancer Maternal Grandfather     Alzheimer Disease Paternal Grandfather     Anesthesia Reaction No family hx of     Clotting Disorder No family hx of        Review of Systems  The complete review of systems is negative other than noted in the HPI or here.   Anesthesia Evaluation   Pt has had prior anesthetic. Type: MAC.    No history of anesthetic complications       ROS/MED HX  ENT/Pulmonary:    (-) tobacco use and recent URI   Neurologic:    (-) no seizures   Cardiovascular:     (+)  - -   -  - -                                 No previous cardiac testing  (-) taking anticoagulants/antiplatelets, BORJA and arrhythmias   METS/Exercise Tolerance: >4 METS    Hematologic:    (-) history of blood clots and history of blood transfusion   Musculoskeletal:  - neg musculoskeletal ROS     GI/Hepatic:    (-) GERD   Renal/Genitourinary:  - neg Renal ROS     Endo:  - neg endo ROS     Psychiatric/Substance Use:    (-) psychiatric history   Infectious Disease:  - neg infectious disease ROS     Malignancy:  - neg malignancy ROS     Other:  - neg other ROS          Virtual visit -  No vitals  were obtained    Physical Exam  Constitutional: Awake, alert, no apparent distress, and appears stated age.  HENT: Normocephalic  Respiratory: non labored breathing; no cough   Neurologic: Oriented to name, place and time.   Neuropsychiatric: Calm, cooperative. Normal affect.      Prior Labs/Diagnostic Studies   All labs and imaging personally reviewed   Not indicated    EKG: Not indicated    12/3/23 MR tib/fib  Between the external markers, persistent focal marked  cortical thickening of the left mid tibial shaft with associated  intra-cortical and intramedullary signal alteration, and mild  periostitis. No soft tissue mass or aggressive imaging features.  Though imaging finding remains nonspecific, primary imaging  differential remains to include acute on chronic stress response  (grade Canelo). Other entity such as osteoid osteoma cannot be entirely  excluded.    CT tib/fib 11/29/23                                                       Impression:  1. Non-aggressive appearing marked focal cortical thickening with  intra-cortical lucencies in the anteromedial tibia. Finding has been  present dating back 11/2017. Imaging differential diagnosis include  entity such osteoid osteoma, sequale of chronic stress  reaction/fractures.    a. No soft tissue component.      The patient's records and results personally reviewed by this provider.     Outside records reviewed from: Care Everywhere      Assessment    Tiffanie Hdez is a 28 year old female seen as a PAC referral for risk assessment and optimization for anesthesia.    Plan/Recommendations  Pt will be optimized for the proposed procedure.  See below for details on the assessment, risk, and preoperative recommendations    NEUROLOGY  - No history of TIA, CVA or seizure    -Post Op delirium risk factors:  No risk identified    ENT  - No current airway concerns.  Will need to be reassessed day of surgery.  Mallampati: Unable to assess  TM: Unable to  "assess    CARDIAC  Denies cardiac history, symptoms or meds. Good exercise tolerance.   - METS (Metabolic Equivalents)>4    RCRI: 0.4% risk of serious cardiac events      PULMONARY  Denies asthma, cough or shortness of breath  Low risk for JENNIFER  - Tobacco History    History   Smoking Status    Never   Smokeless Tobacco    Never       GI: Denies GERD  PONV Medium Risk  Total Score: 2           1 AN PONV: Pt is Female    1 AN PONV: Patient is not a current smoker        ENDOCRINE    - BMI: Estimated body mass index is 19.52 kg/m  as calculated from the following:    Height as of 11/6/23: 1.67 m (5' 5.75\").    Weight as of 11/6/23: 54.4 kg (120 lb).  Healthy Weight (BMI 18.5-24.9)  - No history of Diabetes Mellitus    HEME  VTE Low Risk 0.26%            Total Score: 0      Denies personal or family history of blood clots  Denies history of blood transfusion     MSK: Bone lesion enlarging over time    Different anesthesia methods/types have been discussed with the patient, but they are aware that the final plan will be decided by the assigned anesthesia provider on the date of service.      The patient is optimized for their procedure. AVS with information on surgery time/arrival time, meds and NPO status given by nursing staff. No further diagnostic testing indicated.    Please refer to the physical examination documented by the anesthesiologist in the anesthesia record on the day of surgery.    Video-Visit Details    Type of service:  Video Visit    Provider received verbal consent for a Video Visit from the patient? Yes   Video Start Time: 4:58pm  Video End Time: 5:05pm    Originating Location (pt. Location): Home    Distant Location (provider location):  Off-site  Mode of Communication:  Video Conference via "[employer]  On the day of service:     Prep time: 11 minutes  Visit time: 7 minutes  Documentation time: 13 minutes  ------------------------------------------  Total time: 31 minutes      JH Monroy " CNS  Preoperative Assessment Center  [facility]  Clinic and Surgery Center  Phone: [phone]  Fax: [phone]

## 2023-12-14 NOTE — PATIENT INSTRUCTIONS
Preparing for Your Surgery      Name:  Tiffanie Hdez   MRN:  [id number]   :  1995   Today's Date:  2023       Arriving for surgery:  Surgery date:  2024  Arrival time:  8:30 am    Please come to:     Please come to:      [facility] [address]  [address] [place]  [address]  The Green Ramp for patients and visitors is located beneath the [facility]. The parking facility entrance is at the intersection of [address] [place] [address] and [address]. Patients and visitors who self-park will receive the reduced hospital parking rate (no ticket validation needed).  [employer] parking, located at the [facility] main entrance on [address] [place] [address], is available Monday - Friday from 7 am to 3:30 pm.  Discounted parking pass options can be purchased from  attendants during business hours.  -Check in at the security desk in the [facility] ([facility]) Lobby. They will direct you to the correct elevators.  -Proceed to the 3rd floor, check in at the Adult Surgery Waiting Lounge. [phone]  If you are in need of directions, a wheelchair or escort please stop at the Information Desk in the lobby.  Inform the information person that you are here for surgery; a wheelchair and escort to [address] will be provided.   An escort to the Adult Surgery Waiting Lounge will be provided.    What can I eat or drink?  -  You may eat and drink normally up to 8 hours prior to arrival time. (Until Midnight)  -  You may have clear liquids until 2 hours prior to arrival time. (Until 6:30 am)    Examples of clear liquids:  Water  Clear broth  Juices (apple, white grape, white cranberry  and cider) without pulp  Noncarbonated, powder based beverages  (lemonade and Bhargav-Aid)  Sodas (Sprite, 7-Up, ginger ale and seltzer)  Coffee or tea (without milk or cream)  Gatorade    -  No Alcohol or  cannabis products for at least 24 hours before surgery.     Which medicines can I take?    Hold Aspirin for 7 days before surgery.   Hold Multivitamins for 7 days before surgery.  Hold Supplements for 7 days before surgery.  Hold Ibuprofen (Advil, Motrin) for 3 day(s) before surgery--unless otherwise directed by surgeon.  Hold Naproxen (Aleve) for 4 days before surgery.      -  PLEASE TAKE these medications the day of surgery:  NONE      How do I prepare myself?  - Please take 2 showers (one the night prior to surgery and one the morning of surgery) using Scrubcare or Hibiclens soap.    Use this soap only from the neck to your toes.     Leave the soap on your skin for one minute--then rinse thoroughly.      You may use your own shampoo and conditioner. No other hair products.   - Please remove all jewelry and body piercings.  - No lotions, deodorants or fragrance.  - No makeup or fingernail polish.   - Bring your ID and insurance card.    -If you use a CPAP machine, please bring the CPAP machine, tubing, and mask to hospital.    -If you have a Deep Brain Stimulator, Spinal Cord Stimulator, or any Neuro Stimulator device---you must bring the remote control to the hospital.      ALL PATIENTS GOING HOME THE SAME DAY OF SURGERY ARE REQUIRED TO HAVE A RESPONSIBLE ADULT TO DRIVE AND BE IN ATTENDANCE WITH THEM FOR 24 HOURS FOLLOWING SURGERY.    Covid testing policy as of 12/06/2022  Your surgeon will notify and schedule you for a COVID test if one is needed before surgery--please direct any questions or COVID symptoms to your surgeon      Questions or Concerns:    - For any questions regarding the day of surgery or your hospital stay, please contact the Pre Admission Nursing Office at [phone].       - If you have health changes between today and your surgery, please call your surgeon.       - For questions after surgery, please call your surgeons office.           Current Visitor Guidelines    You may have 2 visitors  in the pre op area.    Visiting hours: 8 a.m. to 8:30 p.m.    You may have four visitors during your inpatient hospital stay.    Patients confirmed or suspected to have symptoms of COVID 19 or flu:     No visitors allowed for adult patients.   Children (under age 18) can have 1 named visitor.     People who are sick or showing symptoms of COVID 19 or flu:    Are not allowed to visit patients--we can only make exceptions in special situations.       Please follow these guidelines for your visit:          Please maintain social distance          Masking is optional--however at times you may be asked to wear a mask for the safety of yourself and others     Clean your hands with alcohol hand . Do this when you arrive at and leave the building and patient room,    And again after you touch your mask or anything in the room.     Go directly to and from the room you are visiting.     Stay in the patient s room during your visit. Limit going to other places in the hospital as much as possible     Leave bags and jackets at home or in the car.     For everyone s health, please don t come and go during your visit. That includes for smoking   during your visit.

## 2023-12-14 NOTE — PROGRESS NOTES
Tiffanie is a 28 year old who is being evaluated via a billable video visit.      How would you like to obtain your AVS? MyChart    HPI           Review of Systems       Physical Exam

## 2023-12-27 ENCOUNTER — PREP FOR PROCEDURE (OUTPATIENT)
Dept: OTHER | Facility: CLINIC | Age: 28
End: 2023-12-27
Payer: COMMERCIAL

## 2023-12-27 NOTE — PROGRESS NOTES
SURGERY PLAN (PRE-OP PLAN)     Patient Position (indicated by x):  X  Supine with torso rolled up on a bump   x  Regular OR table     Kuhn catheter   X  Blue U drape   Blue and White stockinet   Extremity drape   Coban             General Equipment Requests (indicated by x):    X  C-Arm with C-Armor drape     C-Arm (video capable, [employer] 9900 model)     O-Arm with Stealth imaging     Fracture Table     Torey XR Table     SurgiGraphic 6000 (diving board) fluoro table   X  Osteotomes (1/4 inch osteotome)     Pierre Biopsy trephine set w/ K-wire & pituitary rongeurs   X  Small pituitary rongeur   X  Pierre's angled curettes, narrow shaft     Bone graft, kapner gouges     Midas Sterling Medtronic chary, electric motor     Phenol 5%     [place] BMAC stem cell     Vancomycin 1 gram powder     Zometa 4 mg vials     Depo Medrol steroid     Blunt Pelvic Retractor (.55, Blunt Hohmann with  slight bend)     (1) Portable hand held radiation detector machine for sentinel node biopsy and (2) Lymphazurin     [place] Osteotomes     Specimens and cultures (indicated by x):      Tissue cultures, aerobic and anaerobic without gram stain    X  Frozen section    X  pathology specimens - fresh    X  pathology specimens - formalin          Plan  Biopsy of bone lesion of left tibia    Lane Lawrence MD  Adult Joint Reconstruction Fellow  Dept Orthopaedic Surgery, [facility]

## 2024-01-02 ENCOUNTER — HOSPITAL ENCOUNTER (OUTPATIENT)
Facility: CLINIC | Age: 29
Discharge: HOME | End: 2024-01-02
Attending: ORTHOPAEDIC SURGERY | Admitting: ORTHOPAEDIC SURGERY
Payer: COMMERCIAL

## 2024-01-02 ENCOUNTER — APPOINTMENT (OUTPATIENT)
Dept: GENERAL RADIOLOGY | Facility: CLINIC | Age: 29
End: 2024-01-02
Attending: ORTHOPAEDIC SURGERY
Payer: COMMERCIAL

## 2024-01-02 ENCOUNTER — ANESTHESIA (OUTPATIENT)
Dept: SURGERY | Facility: CLINIC | Age: 29
End: 2024-01-02
Payer: COMMERCIAL

## 2024-01-02 VITALS
SYSTOLIC BLOOD PRESSURE: 111 MMHG | RESPIRATION RATE: 16 BRPM | HEART RATE: 44 BPM | BODY MASS INDEX: 19.59 KG/M2 | HEIGHT: 66 IN | DIASTOLIC BLOOD PRESSURE: 72 MMHG | TEMPERATURE: 97.7 F | OXYGEN SATURATION: 100 % | WEIGHT: 121.91 LBS

## 2024-01-02 DIAGNOSIS — M89.9 BONE LESION: Primary | ICD-10-CM

## 2024-01-02 PROCEDURE — 88311 DECALCIFY TISSUE: CPT | Mod: 26 | Performed by: PATHOLOGY

## 2024-01-02 PROCEDURE — 360N000083 HC SURGERY LEVEL 3 W/ FLUORO, PER MIN: Performed by: ORTHOPAEDIC SURGERY

## 2024-01-02 PROCEDURE — 710N000012 HC RECOVERY PHASE 2, PER MINUTE: Performed by: ORTHOPAEDIC SURGERY

## 2024-01-02 PROCEDURE — 999N000180 XR SURGERY CARM FLUORO LESS THAN 5 MIN: Mod: TC

## 2024-01-02 PROCEDURE — 250N000011 HC RX IP 250 OP 636: Performed by: NURSE ANESTHETIST, CERTIFIED REGISTERED

## 2024-01-02 PROCEDURE — 999N000141 HC STATISTIC PRE-PROCEDURE NURSING ASSESSMENT: Performed by: ORTHOPAEDIC SURGERY

## 2024-01-02 PROCEDURE — 87070 CULTURE OTHR SPECIMN AEROBIC: CPT | Performed by: ORTHOPAEDIC SURGERY

## 2024-01-02 PROCEDURE — 250N000009 HC RX 250: Performed by: ORTHOPAEDIC SURGERY

## 2024-01-02 PROCEDURE — 370N000017 HC ANESTHESIA TECHNICAL FEE, PER MIN: Performed by: ORTHOPAEDIC SURGERY

## 2024-01-02 PROCEDURE — 710N000010 HC RECOVERY PHASE 1, LEVEL 2, PER MIN: Performed by: ORTHOPAEDIC SURGERY

## 2024-01-02 PROCEDURE — 88307 TISSUE EXAM BY PATHOLOGIST: CPT | Mod: 26 | Performed by: PATHOLOGY

## 2024-01-02 PROCEDURE — 87102 FUNGUS ISOLATION CULTURE: CPT | Performed by: ORTHOPAEDIC SURGERY

## 2024-01-02 PROCEDURE — 87075 CULTR BACTERIA EXCEPT BLOOD: CPT | Performed by: ORTHOPAEDIC SURGERY

## 2024-01-02 PROCEDURE — 250N000009 HC RX 250: Performed by: NURSE ANESTHETIST, CERTIFIED REGISTERED

## 2024-01-02 PROCEDURE — 88311 DECALCIFY TISSUE: CPT | Mod: TC | Performed by: ORTHOPAEDIC SURGERY

## 2024-01-02 PROCEDURE — 250N000013 HC RX MED GY IP 250 OP 250 PS 637

## 2024-01-02 PROCEDURE — 258N000003 HC RX IP 258 OP 636: Performed by: NURSE ANESTHETIST, CERTIFIED REGISTERED

## 2024-01-02 PROCEDURE — 272N000001 HC OR GENERAL SUPPLY STERILE: Performed by: ORTHOPAEDIC SURGERY

## 2024-01-02 RX ORDER — OXYCODONE HYDROCHLORIDE 5 MG/1
10 TABLET ORAL
Status: DISCONTINUED | OUTPATIENT
Start: 2024-01-02 | End: 2024-01-02 | Stop reason: HOSPADM

## 2024-01-02 RX ORDER — SODIUM CHLORIDE, SODIUM LACTATE, POTASSIUM CHLORIDE, CALCIUM CHLORIDE 600; 310; 30; 20 MG/100ML; MG/100ML; MG/100ML; MG/100ML
INJECTION, SOLUTION INTRAVENOUS CONTINUOUS
Status: DISCONTINUED | OUTPATIENT
Start: 2024-01-02 | End: 2024-01-02 | Stop reason: HOSPADM

## 2024-01-02 RX ORDER — LABETALOL HYDROCHLORIDE 5 MG/ML
10 INJECTION, SOLUTION INTRAVENOUS
Status: DISCONTINUED | OUTPATIENT
Start: 2024-01-02 | End: 2024-01-02 | Stop reason: HOSPADM

## 2024-01-02 RX ORDER — OXYCODONE HYDROCHLORIDE 5 MG/1
5-10 TABLET ORAL EVERY 4 HOURS PRN
Qty: 10 TABLET | Refills: 0 | Status: SHIPPED | OUTPATIENT
Start: 2024-01-02

## 2024-01-02 RX ORDER — ONDANSETRON 2 MG/ML
4 INJECTION INTRAMUSCULAR; INTRAVENOUS EVERY 30 MIN PRN
Status: DISCONTINUED | OUTPATIENT
Start: 2024-01-02 | End: 2024-01-02 | Stop reason: HOSPADM

## 2024-01-02 RX ORDER — ACETAMINOPHEN 325 MG/1
650 TABLET ORAL
Status: DISCONTINUED | OUTPATIENT
Start: 2024-01-02 | End: 2024-01-02 | Stop reason: HOSPADM

## 2024-01-02 RX ORDER — LIDOCAINE 40 MG/G
CREAM TOPICAL
Status: DISCONTINUED | OUTPATIENT
Start: 2024-01-02 | End: 2024-01-02 | Stop reason: HOSPADM

## 2024-01-02 RX ORDER — OXYCODONE HYDROCHLORIDE 5 MG/1
5 TABLET ORAL
Status: DISCONTINUED | OUTPATIENT
Start: 2024-01-02 | End: 2024-01-02 | Stop reason: HOSPADM

## 2024-01-02 RX ORDER — ONDANSETRON 2 MG/ML
INJECTION INTRAMUSCULAR; INTRAVENOUS PRN
Status: DISCONTINUED | OUTPATIENT
Start: 2024-01-02 | End: 2024-01-02

## 2024-01-02 RX ORDER — BUPIVACAINE HYDROCHLORIDE AND EPINEPHRINE 2.5; 5 MG/ML; UG/ML
INJECTION, SOLUTION INFILTRATION; PERINEURAL PRN
Status: DISCONTINUED | OUTPATIENT
Start: 2024-01-02 | End: 2024-01-02 | Stop reason: HOSPADM

## 2024-01-02 RX ORDER — FENTANYL CITRATE 50 UG/ML
25 INJECTION, SOLUTION INTRAMUSCULAR; INTRAVENOUS EVERY 5 MIN PRN
Status: DISCONTINUED | OUTPATIENT
Start: 2024-01-02 | End: 2024-01-02 | Stop reason: HOSPADM

## 2024-01-02 RX ORDER — HYDRALAZINE HYDROCHLORIDE 20 MG/ML
2.5-5 INJECTION INTRAMUSCULAR; INTRAVENOUS EVERY 10 MIN PRN
Status: DISCONTINUED | OUTPATIENT
Start: 2024-01-02 | End: 2024-01-02 | Stop reason: HOSPADM

## 2024-01-02 RX ORDER — HYDROMORPHONE HYDROCHLORIDE 1 MG/ML
0.4 INJECTION, SOLUTION INTRAMUSCULAR; INTRAVENOUS; SUBCUTANEOUS EVERY 5 MIN PRN
Status: DISCONTINUED | OUTPATIENT
Start: 2024-01-02 | End: 2024-01-02 | Stop reason: HOSPADM

## 2024-01-02 RX ORDER — ACETAMINOPHEN 500 MG
1000 TABLET ORAL ONCE
Status: COMPLETED | OUTPATIENT
Start: 2024-01-02 | End: 2024-01-02

## 2024-01-02 RX ORDER — AMOXICILLIN 250 MG
1-2 CAPSULE ORAL 2 TIMES DAILY
Qty: 30 TABLET | Refills: 0 | Status: SHIPPED | OUTPATIENT
Start: 2024-01-02

## 2024-01-02 RX ORDER — FENTANYL CITRATE 50 UG/ML
50 INJECTION, SOLUTION INTRAMUSCULAR; INTRAVENOUS EVERY 5 MIN PRN
Status: DISCONTINUED | OUTPATIENT
Start: 2024-01-02 | End: 2024-01-02 | Stop reason: HOSPADM

## 2024-01-02 RX ORDER — HYDROMORPHONE HYDROCHLORIDE 1 MG/ML
0.2 INJECTION, SOLUTION INTRAMUSCULAR; INTRAVENOUS; SUBCUTANEOUS EVERY 5 MIN PRN
Status: DISCONTINUED | OUTPATIENT
Start: 2024-01-02 | End: 2024-01-02 | Stop reason: HOSPADM

## 2024-01-02 RX ORDER — ACETAMINOPHEN 500 MG
1000 TABLET ORAL ONCE
Status: DISCONTINUED | OUTPATIENT
Start: 2024-01-02 | End: 2024-01-02 | Stop reason: HOSPADM

## 2024-01-02 RX ORDER — DIMENHYDRINATE 50 MG/ML
25 INJECTION, SOLUTION INTRAMUSCULAR; INTRAVENOUS
Status: DISCONTINUED | OUTPATIENT
Start: 2024-01-02 | End: 2024-01-02 | Stop reason: HOSPADM

## 2024-01-02 RX ORDER — LIDOCAINE HYDROCHLORIDE 20 MG/ML
INJECTION, SOLUTION INFILTRATION; PERINEURAL PRN
Status: DISCONTINUED | OUTPATIENT
Start: 2024-01-02 | End: 2024-01-02

## 2024-01-02 RX ORDER — PROPOFOL 10 MG/ML
INJECTION, EMULSION INTRAVENOUS CONTINUOUS PRN
Status: DISCONTINUED | OUTPATIENT
Start: 2024-01-02 | End: 2024-01-02

## 2024-01-02 RX ORDER — ONDANSETRON 4 MG/1
4 TABLET, ORALLY DISINTEGRATING ORAL EVERY 30 MIN PRN
Status: DISCONTINUED | OUTPATIENT
Start: 2024-01-02 | End: 2024-01-02 | Stop reason: HOSPADM

## 2024-01-02 RX ORDER — PROPOFOL 10 MG/ML
INJECTION, EMULSION INTRAVENOUS PRN
Status: DISCONTINUED | OUTPATIENT
Start: 2024-01-02 | End: 2024-01-02

## 2024-01-02 RX ORDER — SODIUM CHLORIDE, SODIUM LACTATE, POTASSIUM CHLORIDE, CALCIUM CHLORIDE 600; 310; 30; 20 MG/100ML; MG/100ML; MG/100ML; MG/100ML
INJECTION, SOLUTION INTRAVENOUS CONTINUOUS PRN
Status: DISCONTINUED | OUTPATIENT
Start: 2024-01-02 | End: 2024-01-02

## 2024-01-02 RX ORDER — DEXAMETHASONE SODIUM PHOSPHATE 4 MG/ML
INJECTION, SOLUTION INTRA-ARTICULAR; INTRALESIONAL; INTRAMUSCULAR; INTRAVENOUS; SOFT TISSUE PRN
Status: DISCONTINUED | OUTPATIENT
Start: 2024-01-02 | End: 2024-01-02

## 2024-01-02 RX ORDER — GLYCOPYRROLATE 0.2 MG/ML
INJECTION, SOLUTION INTRAMUSCULAR; INTRAVENOUS PRN
Status: DISCONTINUED | OUTPATIENT
Start: 2024-01-02 | End: 2024-01-02

## 2024-01-02 RX ORDER — ACETAMINOPHEN 325 MG/1
650 TABLET ORAL EVERY 4 HOURS PRN
Qty: 50 TABLET | Refills: 0 | Status: SHIPPED | OUTPATIENT
Start: 2024-01-02

## 2024-01-02 RX ORDER — FENTANYL CITRATE 50 UG/ML
INJECTION, SOLUTION INTRAMUSCULAR; INTRAVENOUS PRN
Status: DISCONTINUED | OUTPATIENT
Start: 2024-01-02 | End: 2024-01-02

## 2024-01-02 RX ADMIN — DEXAMETHASONE SODIUM PHOSPHATE 4 MG: 4 INJECTION, SOLUTION INTRA-ARTICULAR; INTRALESIONAL; INTRAMUSCULAR; INTRAVENOUS; SOFT TISSUE at 10:31

## 2024-01-02 RX ADMIN — FENTANYL CITRATE 50 MCG: 50 INJECTION INTRAMUSCULAR; INTRAVENOUS at 11:07

## 2024-01-02 RX ADMIN — MIDAZOLAM 2 MG: 1 INJECTION INTRAMUSCULAR; INTRAVENOUS at 10:25

## 2024-01-02 RX ADMIN — PROPOFOL 200 MCG/KG/MIN: 10 INJECTION, EMULSION INTRAVENOUS at 10:31

## 2024-01-02 RX ADMIN — PROPOFOL 50 MG: 10 INJECTION, EMULSION INTRAVENOUS at 11:08

## 2024-01-02 RX ADMIN — ONDANSETRON 4 MG: 2 INJECTION INTRAMUSCULAR; INTRAVENOUS at 11:45

## 2024-01-02 RX ADMIN — SODIUM CHLORIDE, POTASSIUM CHLORIDE, SODIUM LACTATE AND CALCIUM CHLORIDE: 600; 310; 30; 20 INJECTION, SOLUTION INTRAVENOUS at 10:25

## 2024-01-02 RX ADMIN — ACETAMINOPHEN 1000 MG: 500 TABLET ORAL at 09:45

## 2024-01-02 RX ADMIN — PROPOFOL 150 MG: 10 INJECTION, EMULSION INTRAVENOUS at 10:31

## 2024-01-02 RX ADMIN — GLYCOPYRROLATE 0.2 MG: 0.2 INJECTION, SOLUTION INTRAMUSCULAR; INTRAVENOUS at 10:48

## 2024-01-02 RX ADMIN — FENTANYL CITRATE 50 MCG: 50 INJECTION INTRAMUSCULAR; INTRAVENOUS at 11:04

## 2024-01-02 RX ADMIN — LIDOCAINE HYDROCHLORIDE 60 MG: 20 INJECTION, SOLUTION INFILTRATION; PERINEURAL at 10:31

## 2024-01-02 ASSESSMENT — ENCOUNTER SYMPTOMS
SEIZURES: 0
DYSRHYTHMIAS: 0

## 2024-01-02 ASSESSMENT — LIFESTYLE VARIABLES: TOBACCO_USE: 0

## 2024-01-02 ASSESSMENT — ACTIVITIES OF DAILY LIVING (ADL)
ADLS_ACUITY_SCORE: 35

## 2024-01-02 NOTE — ANESTHESIA PROCEDURE NOTES
Airway       Patient location during procedure: OR  Staff -        CRNA: Andrés Kemp APRN CRNA       Performed By: CRNAIndications and Patient Condition       Indications for airway management: juanito-procedural       Induction type:intravenous       Mask difficulty assessment: 1 - vent by mask    Final Airway Details       Final airway type: supraglottic airway    Supraglottic Airway Details        Type: LMA       LMA size: 3.5    Post intubation assessment        Placement verified by: capnometry, equal breath sounds and chest rise        Number of attempts at approach: 1       Number of other approaches attempted: 0       Secured with: tape       Ease of procedure: easy       Dentition: Intact and Unchanged

## 2024-01-02 NOTE — BRIEF OP NOTE
Orthopaedic Surgery Brief Op-Note      Patient: Tiffanie Hdez  : 1995  Date of Service: 2024 11:57 AM    Pre-operative Diagnosis: Bone lesion [M89.9]  Post-operative Diagnosis: same    Procedure(s) Performed: Procedure(s):  BIOPSY, BONE, LOWER EXTREMITY, Tibia diaphysis, Left.  No pre-op antibiotics    Staff: Dr. Jay  Assistants:   Sean Schultz PA-C    Anesthesia: General  EBL: 10 cc  UOP: see anesthesia record  Tourniquet Time: 20 minutes at 250 mmHg    Implants:   * No implants in log *  Drains: none  Intra-op Labs/Cxs/Specimens:   ID Type Source Tests Collected by Time Destination   1 : Left Tibia Bone Biopsy Tibia, Left SURGICAL PATHOLOGY EXAM Anson Jay MD 2024 11:21 AM    A : Left Tibia Bone Biopsy Tibia, Left ANAEROBIC BACTERIAL CULTURE ROUTINE, FUNGAL OR YEAST CULTURE ROUTINE, AEROBIC BACTERIAL CULTURE ROUTINE Anson Jay MD 2024 11:22 AM      Complications: No apparent complications during procedure  Findings: Please see dictated operative note for details    Disposition: Stable to PACU, then discharge home today.     Post-Op Plan:  Assessment/Plan: Tiffanie Hdez is a 28 year old female s/p Procedure(s):  BIOPSY, BONE, LOWER EXTREMITY, Tibia diaphysis, Left.  No pre-op antibiotics on 2024 with Dr. aJy.    Activity: Up with assist and assistive devices as needed until independent.   Weight bearing status: WBAT   Antibiotics: Pre op Cefazolin  Diet: Begin with clear fluids and progress diet as tolerated. Bowel regimen. Anti-emetics PRN.    DVT prophylaxis:  none  Elevation: LLE    Wound Care: Alginate, tegaderm to be removed 7 days post op  Drains: none  Kuhn: none  Pain management: Orals PRN, IV for breakthrough only  X-rays: none  Physical Therapy: none   Occupational Therapy: none   Labs: pathology pending   Cultures: none  Consults: none     Future Appointments   Date Time Provider Department Center   1/15/2024  8:30 AM Sean Schultz PA-C [facility]        Disposition:  Pending progress with pain control on orals, and medical stability, anticipate discharge to Home today.  Follow up: Plan for follow up with Dr. Jay in 2 weeks     I assisted with positioning, prepping and draping, and closure.      Sean Schultz PA-C  1/2/2024 11:57 AM  Physician Assistant   Oncology and Adult Reconstructive Surgery  Dept Orthopaedic Surgery, [facility]     Thank you for allowing me to participate in this patient's care. Please page me directly any questions/concerns.   Securely message with the Vocera Web Console (learn more here)  Text page via [employer] Paging/Directory    If there is no response, if it is a weekend, or if it is during evening hours, please page the orthopaedic surgery resident on call via [employer] Paging/Directory

## 2024-01-02 NOTE — ANESTHESIA PREPROCEDURE EVALUATION
"Anesthesia Pre-Procedure Evaluation    Patient: Tiffanie Hdez   MRN: [id number] : 1995        Procedure : Procedure(s):  BIOPSY, BONE, LOWER EXTREMITY, Tibia diaphysis, Left.  No pre-op antibiotics          Past Medical History:   Diagnosis Date    Bone lesion     Osteoma     Scoliosis       Past Surgical History:   Procedure Laterality Date    NO HISTORY OF SURGERY      [place] teeth extraction   2023      No Known Allergies   Social History     Tobacco Use    Smoking status: Never     Passive exposure: Never    Smokeless tobacco: Never   Substance Use Topics    Alcohol use: Not on file      Wt Readings from Last 1 Encounters:   24 55.3 kg (121 lb 14.6 oz)        Anesthesia Evaluation   Pt has had prior anesthetic. Type of anesthetic: Consious sedation for dental procedure.    No history of anesthetic complications       ROS/MED HX  ENT/Pulmonary:    (-) tobacco use and recent URI   Neurologic:    (-) no seizures   Cardiovascular:     (+)  - -   -  - -                                 No previous cardiac testing  (-) taking anticoagulants/antiplatelets, BORJA and arrhythmias   METS/Exercise Tolerance: >4 METS    Hematologic:    (-) history of blood clots and history of blood transfusion   Musculoskeletal:  - neg musculoskeletal ROS     GI/Hepatic:    (-) GERD   Renal/Genitourinary:  - neg Renal ROS     Endo:  - neg endo ROS     Psychiatric/Substance Use:    (-) psychiatric history   Infectious Disease:  - neg infectious disease ROS     Malignancy:  - neg malignancy ROS     Other:  - neg other ROS          Physical Exam    Airway        Mallampati: II   TM distance: > 3 FB   Neck ROM: full   Mouth opening: > 3 cm    Respiratory Devices and Support         Dental       (+) Completely normal teeth      Cardiovascular   cardiovascular exam normal          Pulmonary   pulmonary exam normal                OUTSIDE LABS:  CBC: No results found for: \"WBC\", \"HGB\", \"HCT\", \"PLT\"  BMP: No results found for: \"NA\", " "\"POTASSIUM\", \"CHLORIDE\", \"CO2\", \"BUN\", \"CR\", \"GLC\"  COAGS: No results found for: \"PTT\", \"INR\", \"FIBR\"  POC: No results found for: \"BGM\", \"HCG\", \"HCGS\"  HEPATIC: No results found for: \"ALBUMIN\", \"PROTTOTAL\", \"ALT\", \"AST\", \"GGT\", \"ALKPHOS\", \"BILITOTAL\", \"BILIDIRECT\", \"FABIÁN\"  OTHER: No results found for: \"PH\", \"LACT\", \"A1C\", \"CONCETTA\", \"PHOS\", \"MAG\", \"LIPASE\", \"AMYLASE\", \"TSH\", \"T4\", \"T3\", \"CRP\", \"SED\"    Anesthesia Plan    ASA Status:  1    NPO Status:  NPO Appropriate    Anesthesia Type: General.     - Airway: LMA   Induction: Intravenous.   Maintenance: TIVA.        Consents    Anesthesia Plan(s) and associated risks, benefits, and realistic alternatives discussed. Questions answered and patient/representative(s) expressed understanding.     - Discussed: Risks, Benefits and Alternatives for BOTH SEDATION and the PROCEDURE were discussed     - Discussed with:  Patient      - Extended Intubation/Ventilatory Support Discussed: No.      - Patient is DNR/DNI Status: No     Use of blood products discussed: No .     Postoperative Care    Pain management: IV analgesics, Oral pain medications, Multi-modal analgesia.   PONV prophylaxis: Ondansetron (or other 5HT-3), Dexamethasone or Solumedrol, Background Propofol Infusion     Comments:               Fuentes Mcnulty MD    I have reviewed the pertinent notes and labs in the chart from the past 30 days and (re)examined the patient.  Any updates or changes from those notes are reflected in this note.                  "

## 2024-01-02 NOTE — ANESTHESIA CARE TRANSFER NOTE
Patient: Tiffanie Hdez    Procedure: Procedure(s):  BIOPSY, BONE, LOWER EXTREMITY, Tibia diaphysis, Left.  No pre-op antibiotics       Diagnosis: Bone lesion [M89.9]  Diagnosis Additional Information: No value filed.    Anesthesia Type:   General     Note:      Level of Consciousness: drowsy  Oxygen Supplementation: face mask  Level of Supplemental Oxygen (L/min / FiO2): 6  Independent Airway: airway patency satisfactory and stable  Dentition: dentition unchanged  Vital Signs Stable: post-procedure vital signs reviewed and stable  Report to RN Given: handoff report given  Patient transferred to: PACU    Handoff Report: Identifed the Patient, Identified the Reponsible Provider, Reviewed the pertinent medical history, Discussed the surgical course, Reviewed Intra-OP anesthesia mangement and issues during anesthesia, Set expectations for post-procedure period and Allowed opportunity for questions and acknowledgement of understanding    Vitals:  Vitals Value Taken Time   BP 92/50 01/02/24 1207   Temp     Pulse 45 01/02/24 1210   Resp 30 01/02/24 1210   SpO2 99 % 01/02/24 1210   Vitals shown include unfiled device data.    Electronically Signed By: JH Quiros CRNA  January 2, 2024  12:11 PM

## 2024-01-02 NOTE — ANESTHESIA POSTPROCEDURE EVALUATION
Patient: Tiffanie Hdez    Procedure: Procedure(s):  BIOPSY, BONE, LOWER EXTREMITY, Tibia diaphysis, Left.  No pre-op antibiotics       Anesthesia Type:  General    Note:  Disposition: Outpatient   Postop Pain Control: Uneventful            Sign Out: Well controlled pain   PONV: No   Neuro/Psych: Uneventful            Sign Out: Acceptable/Baseline neuro status   Airway/Respiratory: Uneventful            Sign Out: Acceptable/Baseline resp. status   CV/Hemodynamics: Uneventful            Sign Out: Acceptable CV status; No obvious hypovolemia; No obvious fluid overload   Other NRE: NONE   DID A NON-ROUTINE EVENT OCCUR? No           Last vitals:  Vitals Value Taken Time   BP 90/62 01/02/24 1230   Temp 36.4  C (97.6  F) 01/02/24 1230   Pulse 45 01/02/24 1237   Resp 6 01/02/24 1237   SpO2 100 % 01/02/24 1237   Vitals shown include unfiled device data.    Electronically Signed By: Anup Trevino MD, MD  January 2, 2024  12:38 PM

## 2024-01-02 NOTE — DISCHARGE INSTRUCTIONS

## 2024-01-03 NOTE — OP NOTE
Operative procedure note:  Date of Service: 1/2/2024 11:57 AM     Pre-operative Diagnosis: Bone lesion [M89.9]  Post-operative Diagnosis: same     Procedure(s) Performed: Procedure(s):  BIOPSY, BONE, LOWER EXTREMITY, Tibia diaphysis, Left.  No pre-op antibiotics     Staff: Dr. Jay     Anesthesia: General  EBL: 10 cc  UOP: see anesthesia record  Tourniquet Time: 20 minutes at 250 mmHg     Implants:   * No implants in log *  Drains: none  Intra-op Labs/Cxs/Specimens:   ID Type Source Tests Collected by Time Destination   1 : Left Tibia Bone Biopsy Tibia, Left SURGICAL PATHOLOGY EXAM Anson Jay MD 1/2/2024 11:21 AM     A : Left Tibia Bone Biopsy Tibia, Left ANAEROBIC BACTERIAL CULTURE ROUTINE, FUNGAL OR YEAST CULTURE ROUTINE, AEROBIC BACTERIAL CULTURE ROUTINE Anson Jay MD 1/2/2024 11:22 AM        Procedure details:  Patient was placed on the operative table supine position.  After induction of general anesthesia, fluoroscopic imaging was used to localize the area of the lesion within her tibia.  Palpation confirmed this area as well.    A longitudinal incision was made through the skin and down through the subcutaneous tissue.  The fascia overlying the anterior compartment and the periosteum was then incised.  I then used osteotomes to come across the surface of the bone and multiple deep shavings of the bone were obtained.  I then used intraoperative fluoroscopic imaging to confirm adequate sampling of the cortical tissue.  I smoothed over the proximal distal edges so that the palpable defect was less apparent.    Tissue was sent for cultures aerobic and anaerobic.  Perioperative antibiotics were then administered.    The wound was now closed using interrupted 2-0 Vicryl sutures for the fascial layer and subcutaneous tissue and 3-0 PDS suture in the skin.  Sterile dressing was applied.    Complications: No apparent complications during procedure  Findings: Please see dictated operative note for  details     Disposition: Stable to PACU, then discharge home today.

## 2024-01-09 LAB
BACTERIA BONE ANAEROBE+AEROBE CULT: NO GROWTH
BACTERIA BONE ANAEROBE+AEROBE CULT: NORMAL

## 2024-01-11 LAB
PATH REPORT.COMMENTS IMP SPEC: NORMAL
PATH REPORT.FINAL DX SPEC: NORMAL
PATH REPORT.GROSS SPEC: NORMAL
PATH REPORT.MICROSCOPIC SPEC OTHER STN: NORMAL
PATH REPORT.RELEVANT HX SPEC: NORMAL
PHOTO IMAGE: NORMAL

## 2024-01-12 NOTE — PROGRESS NOTES
[facility], Orthopaedic Oncology Surgery Consultation  by Anson Jay M.D.    Tiffanie Hdez MRN# [id number]    YOB: 1995     Requesting physician: No ref. provider found  No Ref-Primary, Physician  Arlin Juárez, CNP [facility]     Treatments:  1/2/2024, Left tibia diaphysis bone biopsy, (Pierre) [facility]      HPI:  28-year-old female presents today approximately 2 weeks status post left tibial diaphyseal bone biopsy.  Her pain is well-controlled.  She has been weightbearing as tolerated.  She has no concerns for infection    Physical exam:  EXAMINATION pertinent findings:   PSYCH: Pleasant, healthy-appearing, alert, oriented x3, cooperative. Normal mood and affect.  VITAL SIGNS: not currently breastfeeding.  Reviewed nursing intake notes.   There is no height or weight on file to calculate BMI.  RESP: non labored breathing   ABD: benign, soft, non-tender, no acute peritoneal findings  SKIN: grossly normal   LYMPHATIC: grossly normal, no adenopathy, no extremity edema  NEURO: grossly normal , no motor deficits  VASCULAR: satisfactory perfusion of all extremities   MUSCULOSKELETAL:      Left lower extremity: The incision is clean, dry, and intact with no erythema, dehiscence, or discharge.  Calves are soft nontender with negative Homans' sign.      Data:    1/15/2024 x-ray left tib-fib:    Impression:  Postoperative changes of partial excision to the indeterminant marked  focal thickening of the left tibia shaft. No acute osseous  abnormality.    Surgical Pathology Exam: [id number]  Order: [id number]  Collected 1/2/2024 11:21 AM       Status: Final result       Visible to patient: Yes (seen)    0 Result Notes       Component  Resulting Agency   Case Report   Surgical Pathology Report                         Case: [id number]                                   Authorizing Provider:  Anson Jay MD        Collected:           01/02/2024 11:21 AM           Ordering Location:      [facility] MAIN OR                 Received:            01/02/2024 12:16 PM           Pathologist:           Dell Lara MD                                                           Specimen:    Tibia, Left, Left Tibia                                                                    Final Diagnosis   Left tibia, biopsy:  - Fragments of sclerotic bone  - Benign skeletal muscle and fibroconnective tissue  - Negative for inflammation and malignancy   Electronically signed by Dell Lara MD on 1/11/2024 at  7:20 PM   Comment  [facility]   The histologic features are compatible with an osteoma in the appropriate imaging context.  Please correlate.   Clinical Information  [facility]   This is a 28-year-old female with left tibial diaphyseal cortical lesion of uncertain etiology.  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  Recent CT scan showed marked focal cortical thickening with intracortical mild T2  hyperintensity, measuring approximately 1.9 x 1.2 x 5.5 cm, previously approximately 1.5 x 1.3 x 4.1 cm using similar measurement technique.               Assessment and Plan:   Assessment:  28-year-old female who presents today approximately 2 weeks status post left tibial diaphyseal bone biopsy, pathology consistent with osteoid osteoma     Plan:  Reviewed pathology which showed benign bony fragments.  This is consistent with an osteoid osteoma with atypical presentation.  Weight-bear as tolerated  Okay to get the incision wet but should avoid submerging for 2 weeks  Follow-up in 1 year with repeat x-rays of the tib-fib    Sean Schultz PA-C  Physician Assistant   Oncology and Adult Reconstructive Surgery  Dept Orthopaedic Surgery, [facility]    This note was created using dictation software and may contain errors.  Please contact the creator for any clarifications that are needed.

## 2024-01-15 ENCOUNTER — OFFICE VISIT (OUTPATIENT)
Dept: ORTHOPEDICS | Facility: CLINIC | Age: 29
End: 2024-01-15
Payer: COMMERCIAL

## 2024-01-15 ENCOUNTER — ANCILLARY PROCEDURE (OUTPATIENT)
Dept: GENERAL RADIOLOGY | Facility: CLINIC | Age: 29
End: 2024-01-15
Attending: PHYSICIAN ASSISTANT
Payer: COMMERCIAL

## 2024-01-15 DIAGNOSIS — M89.9 BONE LESION: Primary | ICD-10-CM

## 2024-01-15 DIAGNOSIS — M89.9 BONE LESION: ICD-10-CM

## 2024-01-15 PROCEDURE — 73590 X-RAY EXAM OF LOWER LEG: CPT | Mod: LT | Performed by: RADIOLOGY

## 2024-01-15 PROCEDURE — 99024 POSTOP FOLLOW-UP VISIT: CPT | Performed by: PHYSICIAN ASSISTANT

## 2024-01-15 NOTE — NURSING NOTE
Reason For Visit:   Chief Complaint   Patient presents with    Surgical Followup     2 wk post-op left tibia biopsy DOS 1/2/24       There were no vitals taken for this visit.    Pain Assessment  Patient Currently in Pain: Kailyn Vergara, ATC

## 2024-01-15 NOTE — LETTER
1/15/2024         RE: Tiffanie Hdez  [address]  [facility] [address]        Dear Colleague,    Thank you for referring your patient, Tiffanie Hdez, to the [facility] [place]. Please see a copy of my visit note below.        [facility], Orthopaedic Oncology Surgery Consultation  by Anson Jay M.D.    Tiffnaie Hdez MRN# [id number]    YOB: 1995     Requesting physician: No ref. provider found  No Ref-Primary, Physician  Arlin Juárez, CNP [facility]     Treatments:  1/2/2024, Left tibia diaphysis bone biopsy, ([facility]) [facility]      HPI:  28-year-old female presents today approximately 2 weeks status post left tibial diaphyseal bone biopsy.  Her pain is well-controlled.  She has been weightbearing as tolerated.  She has no concerns for infection    Physical exam:  EXAMINATION pertinent findings:   PSYCH: Pleasant, healthy-appearing, alert, oriented x3, cooperative. Normal mood and affect.  VITAL SIGNS: not currently breastfeeding.  Reviewed nursing intake notes.   There is no height or weight on file to calculate BMI.  RESP: non labored breathing   ABD: benign, soft, non-tender, no acute peritoneal findings  SKIN: grossly normal   LYMPHATIC: grossly normal, no adenopathy, no extremity edema  NEURO: grossly normal , no motor deficits  VASCULAR: satisfactory perfusion of all extremities   MUSCULOSKELETAL:      Left lower extremity: The incision is clean, dry, and intact with no erythema, dehiscence, or discharge.  Calves are soft nontender with negative Homans' sign.      Data:    Surgical Pathology Exam: [id number]  Order: [id number]  Collected 1/2/2024 11:21 AM       Status: Final result       Visible to patient: Yes (seen)    0 Result Notes       Component  Resulting Agency   Case Report   Surgical Pathology Report                         Case: [id number]                                   Authorizing Provider:  Anson Jay MD         Collected:           01/02/2024 11:21 AM           Ordering Location:     [facility] OR                 Received:            01/02/2024 12:16 PM           Pathologist:           Dell Lara MD                                                           Specimen:    Tibia, Left, Left Tibia                                                                    Final Diagnosis   Left tibia, biopsy:  - Fragments of sclerotic bone  - Benign skeletal muscle and fibroconnective tissue  - Negative for inflammation and malignancy   Electronically signed by Dell Lara MD on 1/11/2024 at  7:20 PM   Comment  [facility]   The histologic features are compatible with an osteoma in the appropriate imaging context.  Please correlate.   Clinical Information  [facility]   This is a 28-year-old female with left tibial diaphyseal cortical lesion of uncertain etiology.  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  Recent CT scan showed marked focal cortical thickening with intracortical mild T2  hyperintensity, measuring approximately 1.9 x 1.2 x 5.5 cm, previously approximately 1.5 x 1.3 x 4.1 cm using similar measurement technique.               Assessment and Plan:   Assessment:  28-year-old female who presents today approximately 2 weeks status post left tibial diaphyseal bone biopsy, pathology consistent with osteoid osteoma     Plan:  Reviewed pathology which showed benign bony fragments.  This is consistent with an osteoid osteoma with atypical presentation.  Weight-bear as tolerated  Okay to get the incision wet but should avoid submerging for 2 weeks  Follow-up in 1 year with repeat x-rays of the tib-fib    Sean Schultz PA-C  Physician Assistant   Oncology and Adult Reconstructive Surgery  Dept Orthopaedic Surgery, [facility]    This note was created using dictation software and may contain errors.  Please contact the creator for any clarifications that are needed.    cc    Arlin Juárez NP  [address] [place] [address]  [address]ve,   [place] [address]

## 2024-01-30 LAB — BACTERIA BONE ANAEROBE+AEROBE CULT: NO GROWTH

## 2024-10-31 ENCOUNTER — OFFICE VISIT (OUTPATIENT)
Dept: OBGYN | Facility: CLINIC | Age: 29
End: 2024-10-31
Payer: COMMERCIAL

## 2024-10-31 ENCOUNTER — LAB (OUTPATIENT)
Dept: LAB | Facility: CLINIC | Age: 29
End: 2024-10-31
Payer: COMMERCIAL

## 2024-10-31 VITALS
SYSTOLIC BLOOD PRESSURE: 113 MMHG | DIASTOLIC BLOOD PRESSURE: 59 MMHG | WEIGHT: 120 LBS | TEMPERATURE: 97.6 F | BODY MASS INDEX: 19.52 KG/M2 | HEART RATE: 66 BPM | RESPIRATION RATE: 16 BRPM

## 2024-10-31 DIAGNOSIS — Z11.3 ROUTINE SCREENING FOR STI (SEXUALLY TRANSMITTED INFECTION): ICD-10-CM

## 2024-10-31 DIAGNOSIS — Z30.430 ENCOUNTER FOR IUD INSERTION: Primary | ICD-10-CM

## 2024-10-31 DIAGNOSIS — Z30.430 ENCOUNTER FOR INSERTION OF INTRAUTERINE CONTRACEPTIVE DEVICE: ICD-10-CM

## 2024-10-31 LAB
HCG UR QL: NEGATIVE
T PALLIDUM AB SER QL: NONREACTIVE

## 2024-10-31 PROCEDURE — 87340 HEPATITIS B SURFACE AG IA: CPT

## 2024-10-31 PROCEDURE — 87591 N.GONORRHOEAE DNA AMP PROB: CPT | Performed by: OBSTETRICS & GYNECOLOGY

## 2024-10-31 PROCEDURE — 99213 OFFICE O/P EST LOW 20 MIN: CPT | Mod: 25 | Performed by: OBSTETRICS & GYNECOLOGY

## 2024-10-31 PROCEDURE — 81025 URINE PREGNANCY TEST: CPT | Performed by: OBSTETRICS & GYNECOLOGY

## 2024-10-31 PROCEDURE — 36415 COLL VENOUS BLD VENIPUNCTURE: CPT

## 2024-10-31 PROCEDURE — 86780 TREPONEMA PALLIDUM: CPT

## 2024-10-31 PROCEDURE — 87389 HIV-1 AG W/HIV-1&-2 AB AG IA: CPT

## 2024-10-31 PROCEDURE — 87491 CHLMYD TRACH DNA AMP PROBE: CPT | Performed by: OBSTETRICS & GYNECOLOGY

## 2024-10-31 PROCEDURE — 86803 HEPATITIS C AB TEST: CPT

## 2024-10-31 PROCEDURE — 58300 INSERT INTRAUTERINE DEVICE: CPT | Performed by: OBSTETRICS & GYNECOLOGY

## 2024-10-31 NOTE — PATIENT INSTRUCTIONS
What Mirena/KyLeena/May Users May Expect    What to watch for right after Mirena is placed  Some women may experience uterine cramps, bleeding, and/or dizziness during and right after Mirena is placed. To help minimize the cramps, you may taken ibuprofen 600 mg with food prior to your appointment. These symptoms should improve over the next 24 hours.  Mild cramping may be present for a few days after your placement  As a follow up, you should check your strings on 4 weeks or visit your clinic once in the first 4 to 12 weeks after Mirena is placed to make sure it is in the right position. After that, Mirena can be checked once a year as part of your routine exam.    Please use a back-up method (abstinence or condoms) for 5 days after placement.    Your periods may change  For the first 3 to 6 months, your monthly period may become irregular. You may also have frequent spotting or light bleeding. A few women have heavy bleeding during this time. After your body adjusts, the number of bleeding days is likely to decrease (but may remain irregular), and you may even find that your periods stop altogether for as long as Mirena is in place. Around the end of the third month of use, you may see up to a 75% reduction in the amount of menstrual bleeding. By one year, about 1 out of 5 users may hay have no period at all. At the end of two years, 70% have little or no bleeding. Your periods will return rapidly once Mirena is removed.     Mirena Strings  You may check your own Mirena strings by inserting a finger into the vagina and feeling the strings as they exit the cervix.  The strings will initially feel firm, like fishing line, but will soften over a few weeks.  After the strings have softened, you or your partner should not be able to feel the strings during intercourse.  If you can feel the IUD, see your healthcare provider to have the position confirmed.  You may use tampons with Mirena in place.    Mirena does not  protect against HIV or STDs.  Mirena does not prevent the formation of ovarian cysts.  Mirena does not typically reduce acne or cause weight gain or mood changes.    Please call [facility] at [phone] if you have questions or concerns.    For more information:  [email or web address] [email or web address] Users May Expect    What to watch for right after Mirena is placed  Some women may experience uterine cramps, bleeding, and/or dizziness during and right after Mirena is placed. To help minimize the cramps, you may taken ibuprofen 600 mg with food prior to your appointment. These symptoms should improve over the next 24 hours.  Mild cramping may be present for a few days after your placement  As a follow up, you should check your strings on 4 weeks or visit your clinic once in the first 4 to 12 weeks after Mirena is placed to make sure it is in the right position. After that, Mirena can be checked once a year as part of your routine exam.    Please use a back-up method (abstinence or condoms) for 5 days after placement.    Your periods may change  For the first 3 to 6 months, your monthly period may become irregular. You may also have frequent spotting or light bleeding. A few women have heavy bleeding during this time. After your body adjusts, the number of bleeding days is likely to decrease (but may remain irregular), and you may even find that your periods stop altogether for as long as Mirena is in place. Around the end of the third month of use, you may see up to a 75% reduction in the amount of menstrual bleeding. By one year, about 1 out of 5 users may hay have no period at all. At the end of two years, 70% have little or no bleeding. Your periods will return rapidly once Mirena is removed.     Mirena Strings  You may check your own Mirena strings by inserting a finger into the vagina and feeling the strings as they exit the cervix.  The strings will initially feel firm, like fishing line,  but will soften over a few weeks.  After the strings have softened, you or your partner should not be able to feel the strings during intercourse.  If you can feel the IUD, see your healthcare provider to have the position confirmed.  You may use tampons with Mirena in place.    Mirena does not protect against HIV or STDs.  Mirena does not prevent the formation of ovarian cysts.  Mirena does not typically reduce acne or cause weight gain or mood changes.    Please call [facility] at [phone] if you have questions or concerns.    For more information:  [email or web address]

## 2024-10-31 NOTE — PROGRESS NOTES
GYN Progress Note     CC: Encounter for IUD insertion     HPI: Tiffanie Hdez is a 28 year old G0 who presents for IUD insertion. She had the Paragard removed about a year ago due to heavier and more painful periods and switched back to using condoms consistently. She reports that her periods have returned to baseline but she would like to have a Levonorgestrel IUD placed. Not planning pregnancy for the next 3-5 years at least. She denies any other concerns but was open to STI screening, has had one new partner in the past year but reports that he was screened for STIs and negative.     O: /59 (BP Location: Left arm, Patient Position: Sitting, Cuff Size: Adult Regular)   Pulse 66   Temp 97.6  F (36.4  C)   Resp 16   Wt 54.4 kg (120 lb)   LMP 10/23/2024 (Exact Date)   BMI 19.52 kg/m      General: Patient alert and oriented, no acute distress  CV: no peripheral edema or cyanosis  Resp: normal respiratory effort and equal lung expansion  Abdomen: non-tender to light and deep palpation, no masses, organomegaly or hernia  : normal external genitalia without lesions. Urethral meatus normal, urethra and bladder non-tender to palpation. Vaginal mucosa normal in appearance without lesions, scant physiologic discharge. Cervix appears grossly normal.  Uterus normal size and contour, non-tender. No adnexal fullness or masses present.  Ext: non-tender, no edema    Kyleena IUD Insertion Procedure Note    After a discussion of her options for contraception, the patient was interested in a Kyleena IUD. We reviewed the risks, benefits and alternatives. The patient was counseled that she may have irregular bleeding for the first 3-6 months. She understands that she may have lighter periods or no periods at all. The patient was informed that the Kyleena is FDA approved for 5 years however the IUD can be removed at any time. We reviewed risks of uterine perforation and she was made aware that the risk of uterine  perforation that might require abdominal surgery is 1/1,000 insertions. She understands that rate of expulsion in the first year of use is 2-10%. There is also a 1% risk of infection in the first 20 days after insertion. We reviewed that the IUD is a very effective form of birth control however IUDs do have a low risk of failure to prevent pregnancy. We reviewed that the of Kyleena IUD is 5-11/1,000 women. In case of pregnancy after failure of IUD, the risk of ectopic pregnancy is increased. After thorough counseling regarding risks and benefits of the IUD, at which time the patient's questions were answered to her apparent satisfaction, consent for insertion was obtained.    A timeout was performed and the correct patient and procedure were verified.    A bimanual exam was performed and a speculum exam was performed and the cervix was visualized and prepped with betadine.  A single tooth tenaculum was placed on the anterior lip of the cervix.  The uterus sounded to 7 cm.  The Kylenna IUD was placed without difficulty using standard insertion technique. The strings were trimmed to about 3 cm long. The patient tolerated the procedure well. EBL minimal.         Assessment and plan:   Tiffanie Hdez is a 28 year old who presents to clinic for follow up due to a history of the following concerns:   (Z30.430) Encounter for IUD insertion  (primary encounter diagnosis)  -The patient was instructed on checking for the IUD strings. The patient was counseled that if she is ever unable to feel strings to contact me to be seen and use condoms until we can ensure the IUD is in the proper location.  -The patient was instructed to call if she experiences heavy bleeding, abdominal pain, or any other new concerns. The patient voiced understanding.  Plan: HCG qualitative urine         (Z11.3) Routine screening for STI (sexually transmitted infection)  Plan: Hepatitis B surface antigen, Hepatitis C         antibody, HIV Antigen  Antibody Combo Cascade,         Treponema Abs w Reflex to RPR and Titer,         NEISSERIA GONORRHOEA PCR, CHLAMYDIA TRACHOMATIS        PCR            Dispo: RTC as needed     Mara Desai MD

## 2024-11-01 LAB
C TRACH DNA SPEC QL NAA+PROBE: NEGATIVE
HBV SURFACE AG SERPL QL IA: NONREACTIVE
HCV AB SERPL QL IA: NONREACTIVE
HIV 1+2 AB+HIV1 P24 AG SERPL QL IA: NONREACTIVE
N GONORRHOEA DNA SPEC QL NAA+PROBE: NEGATIVE

## 2024-12-15 ENCOUNTER — HEALTH MAINTENANCE LETTER (OUTPATIENT)
Age: 29
End: 2024-12-15

## 2025-03-19 DIAGNOSIS — M89.9 BONE LESION: Primary | ICD-10-CM

## 2025-03-19 NOTE — PROGRESS NOTES
[facility], Orthopaedic Oncology Surgery Consultation  by Anson Jay M.D.    Tiffanie Hdez MRN# [id number]    YOB: 1995     Requesting physician: No ref. provider found  No Ref-Primary, Physician  Arlin Juárez, CNP [facility]     Treatments:  1/2/2024, Left tibia diaphysis bone biopsy, (Pierre) [employer]    HPI:  Presents today approximately 1 year and 3 months status post left tibial diaphyseal bone biopsy.  She has not had any issues with the surgical site, has not had any pain. She has been weightbearing as tolerated.  She is back to full activity including running.  No concerns for infection.  No questions today.    Physical exam:  EXAMINATION pertinent findings:   PSYCH: Pleasant, healthy-appearing, alert, oriented x3, cooperative. Normal mood and affect.  VITAL SIGNS: not currently breastfeeding.  Reviewed nursing intake notes.   There is no height or weight on file to calculate BMI.  RESP: non labored breathing   ABD: benign, soft, non-tender, no acute peritoneal findings  SKIN: grossly normal   LYMPHATIC: grossly normal, no adenopathy, no extremity edema  NEURO: grossly normal , no motor deficits  VASCULAR: satisfactory perfusion of all extremities   MUSCULOSKELETAL:      Left lower extremity: The incision is well-healed about anterior tibia.  There is EHL, FHL, tib ant, gastroc.  Sensation tact throughout foot.  Foot warm well-perfused.      Data:    4/3/2025 x-ray left tib-fib:    Redemonstration of anterior tibial cortical lesion.  There does not appear to be any change in size.  Cortical edges appear to have remodeled.      Surgical Pathology Exam: [id number]  Order: [id number]  Collected 1/2/2024 11:21 AM       Status: Final result       Visible to patient: Yes (seen)    0 Result Notes       Component  Resulting Agency   Case Report   Surgical Pathology Report                         Case: [id number]                                   Authorizing Provider:  Pierre  Anson CASTANON MD        Collected:           01/02/2024 11:21 AM           Ordering Location:     [facility] OR                 Received:            01/02/2024 12:16 PM           Pathologist:           Dell Lara MD                                                           Specimen:    Tibia, Left, Left Tibia                                                                    Final Diagnosis   Left tibia, biopsy:  - Fragments of sclerotic bone  - Benign skeletal muscle and fibroconnective tissue  - Negative for inflammation and malignancy   Electronically signed by Dell Lara MD on 1/11/2024 at  7:20 PM   Comment  [facility]MA[facility]   The histologic features are compatible with an osteoma in the appropriate imaging context.  Please correlate.   Clinical Information  [facility]MAYO   This is a 28-year-old female with left tibial diaphyseal cortical lesion of uncertain etiology.  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  Recent CT scan showed marked focal cortical thickening with intracortical mild T2  hyperintensity, measuring approximately 1.9 x 1.2 x 5.5 cm, previously approximately 1.5 x 1.3 x 4.1 cm using similar measurement technique.               Assessment and Plan:   Assessment:  29 female who presents today approximately 1 year and 3 months status post left tibial diaphyseal bone biopsy, pathology consistent with osteoid osteoma.     Plan:  Continue activity weightbearing as tolerated in left lower extremity  Follow-up as needed        Allison Martinez MD LISA PGY-4    Attending MD (Dr. Anson Jay) Attestation :  This patient was seen and evaluated by me including a history, exam, and interpretation of all imaging and/or lab data.  I formulated the treatment plan along with either a physician's assistant (PA-C) or training physician (resident/fellow), who also saw the patient. That individual or a scribe has documented the visit in the attached note which I approve.    Anson Jay MD  [facility]  Professor  Oncology and Adult Reconstructive Surgery  Dept Orthopaedic Surgery, [facility]  [phone] office, [phone] pager  [email or web address]    Total combined visit time and work time before and after clinic visit, independent of trainee, on encounter date = 20 min      This note was created using dictation software and may contain errors.  Please contact the creator for any clarifications that are needed.

## 2025-04-03 ENCOUNTER — OFFICE VISIT (OUTPATIENT)
Dept: ORTHOPEDICS | Facility: CLINIC | Age: 30
End: 2025-04-03
Payer: COMMERCIAL

## 2025-04-03 DIAGNOSIS — M89.9 BONE LESION: Primary | ICD-10-CM

## 2025-04-03 NOTE — LETTER
4/3/2025      Tiffanie Hdez  [address]   [address]  [place] [address]      Dear Colleague,    Thank you for referring your patient, Tiffanie Hdez, to the [facility] [place]. Please see a copy of my visit note below.        [facility], Orthopaedic Oncology Surgery Consultation  by Anson Jay M.D.    Tiffanie Hdez MRN# [id number]    YOB: 1995     Requesting physician: No ref. provider found  No Ref-Primary, Physician  Arlin Juárez, CNP [facility]     Treatments:  1/2/2024, Left tibia diaphysis bone biopsy, (Pierre) [facility]    HPI:  Presents today approximately 1 year and 3 months status post left tibial diaphyseal bone biopsy.  She has not had any issues with the surgical site, has not had any pain. She has been weightbearing as tolerated.  She is back to full activity including running.  No concerns for infection.  No questions today.    Physical exam:  EXAMINATION pertinent findings:   PSYCH: Pleasant, healthy-appearing, alert, oriented x3, cooperative. Normal mood and affect.  VITAL SIGNS: not currently breastfeeding.  Reviewed nursing intake notes.   There is no height or weight on file to calculate BMI.  RESP: non labored breathing   ABD: benign, soft, non-tender, no acute peritoneal findings  SKIN: grossly normal   LYMPHATIC: grossly normal, no adenopathy, no extremity edema  NEURO: grossly normal , no motor deficits  VASCULAR: satisfactory perfusion of all extremities   MUSCULOSKELETAL:      Left lower extremity: The incision is well-healed about anterior tibia.  There is EHL, FHL, tib ant, gastroc.  Sensation tact throughout foot.  Foot warm well-perfused.      Data:    4/3/2025 x-ray left tib-fib:    Redemonstration of anterior tibial cortical lesion.  There does not appear to be any change in size.  Cortical edges appear to have remodeled.      Surgical Pathology Exam: [id number]  Order: [id number]  Collected 1/2/2024 11:21 AM        Status: Final result       Visible to patient: Yes (seen)    0 Result Notes       Component  Resulting Agency   Case Report   Surgical Pathology Report                         Case: [id number]                                   Authorizing Provider:  Anson Jay MD        Collected:           01/02/2024 11:21 AM           Ordering Location:      MAIN OR                 Received:            01/02/2024 12:16 PM           Pathologist:           Dell Lara MD                                                           Specimen:    Tibia, Left, Left Tibia                                                                    Final Diagnosis   Left tibia, biopsy:  - Fragments of sclerotic bone  - Benign skeletal muscle and fibroconnective tissue  - Negative for inflammation and malignancy   Electronically signed by Dell Lara MD on 1/11/2024 at  7:20 PM   Comment  [facility]MAY[facility]   The histologic features are compatible with an osteoma in the appropriate imaging context.  Please correlate.   Clinical Information  UUMAYO   This is a 28-year-old female with left tibial diaphyseal cortical lesion of uncertain etiology.  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  Recent CT scan showed marked focal cortical thickening with intracortical mild T2  hyperintensity, measuring approximately 1.9 x 1.2 x 5.5 cm, previously approximately 1.5 x 1.3 x 4.1 cm using similar measurement technique.               Assessment and Plan:   Assessment:  29 female who presents today approximately 1 year and 3 months status post left tibial diaphyseal bone biopsy, pathology consistent with osteoid osteoma.     Plan:  Continue activity weightbearing as tolerated in left lower extremity  Follow-up as needed        Allison Martinez MD LISA PGY-4    Attending MD (Dr. Anson Jay) Attestation :  This patient was seen and evaluated by me including a history, exam, and interpretation of all imaging and/or lab data.  I formulated the treatment  plan along with either a physician's assistant (PA-C) or training physician (resident/fellow), who also saw the patient. That individual or a scribe has documented the visit in the attached note which I approve.    Anson Jay MD  [facility] Family Professor  Oncology and Adult Reconstructive Surgery  Dept Orthopaedic Surgery, [facility]  [phone] office, [phone] pager  [email or web address]    Total combined visit time and work time before and after clinic visit, independent of trainee, on encounter date = 20 min      This note was created using dictation software and may contain errors.  Please contact the creator for any clarifications that are needed.          Again, thank you for allowing me to participate in the care of your patient.        Sincerely,        Anson Jay MD    Electronically signed

## (undated) DEVICE — SUCTION MANIFOLD NEPTUNE 2 SYS 4 PORT 0702-020-000

## (undated) DEVICE — DRAPE STOCKINETTE 8" 8586

## (undated) DEVICE — DRAPE C-ARM W/STRAPS 42X72" 07-CA104

## (undated) DEVICE — STRAP KNEE/BODY 31143004

## (undated) DEVICE — LINEN ORTHO PACK 5446

## (undated) DEVICE — PACK LOWER EXTREMITY RIVERSIDE SOP32LEFSX

## (undated) DEVICE — DRSG TEGADERM 4X4 3/4" 1626W

## (undated) DEVICE — SOL NACL 0.9% IRRIG 1000ML BOTTLE 2F7124

## (undated) DEVICE — PREP DURAPREP 26ML APL 8630

## (undated) DEVICE — GLOVE BIOGEL PI MICRO INDICATOR UNDERGLOVE SZ 7.5 48975

## (undated) DEVICE — LINEN GOWN OVERSIZE 5408

## (undated) DEVICE — CONTAINER SPECIMEN 4OZ STERILE 17099

## (undated) DEVICE — GLOVE BIOGEL PI ULTRATOUCH SZ 7.0 41170

## (undated) DEVICE — ESU GROUND PAD UNIVERSAL W/O CORD

## (undated) DEVICE — TRAY PREP DRY SKIN SCRUB 067

## (undated) DEVICE — SU DERMABOND ADVANCED .7ML DNX12

## (undated) DEVICE — DRSG TELFA 3X8" 1238

## (undated) DEVICE — DRSG TEGADERM ALGINATE AG 4X5" 90303

## (undated) DEVICE — GOWN IMPERVIOUS SPECIALTY XLG/XLONG 32474

## (undated) DEVICE — PREP POVIDONE-IODINE 7.5% SCRUB 4OZ BOTTLE MDS093945

## (undated) DEVICE — SPONGE LAP 18X18" X8435

## (undated) DEVICE — SU VICRYL 2-0 CT-2 27" J333H

## (undated) DEVICE — KIT CULTURE TRANSPORT SYS A.C.T. II DUAL ANEROBE R124022

## (undated) DEVICE — SU PDS II 3-0 PS-1 18" Z683G

## (undated) RX ORDER — FENTANYL CITRATE 50 UG/ML
INJECTION, SOLUTION INTRAMUSCULAR; INTRAVENOUS
Status: DISPENSED
Start: 2024-01-02

## (undated) RX ORDER — CEFAZOLIN SODIUM 1 G/3ML
INJECTION, POWDER, FOR SOLUTION INTRAMUSCULAR; INTRAVENOUS
Status: DISPENSED
Start: 2024-01-02

## (undated) RX ORDER — ACETAMINOPHEN 500 MG
TABLET ORAL
Status: DISPENSED
Start: 2024-01-02

## (undated) RX ORDER — ONDANSETRON 2 MG/ML
INJECTION INTRAMUSCULAR; INTRAVENOUS
Status: DISPENSED
Start: 2024-01-02

## (undated) RX ORDER — FENTANYL CITRATE-0.9 % NACL/PF 10 MCG/ML
PLASTIC BAG, INJECTION (ML) INTRAVENOUS
Status: DISPENSED
Start: 2024-01-02

## (undated) RX ORDER — PROPOFOL 10 MG/ML
INJECTION, EMULSION INTRAVENOUS
Status: DISPENSED
Start: 2024-01-02